# Patient Record
Sex: FEMALE | Race: ASIAN | NOT HISPANIC OR LATINO | URBAN - METROPOLITAN AREA
[De-identification: names, ages, dates, MRNs, and addresses within clinical notes are randomized per-mention and may not be internally consistent; named-entity substitution may affect disease eponyms.]

---

## 2017-11-28 ENCOUNTER — COMMUNICATION - HEALTHEAST (OUTPATIENT)
Dept: FAMILY MEDICINE | Facility: CLINIC | Age: 37
End: 2017-11-28

## 2017-11-28 ENCOUNTER — COMMUNICATION - HEALTHEAST (OUTPATIENT)
Dept: SCHEDULING | Facility: CLINIC | Age: 37
End: 2017-11-28

## 2017-11-29 ENCOUNTER — OFFICE VISIT - HEALTHEAST (OUTPATIENT)
Dept: FAMILY MEDICINE | Facility: CLINIC | Age: 37
End: 2017-11-29

## 2017-11-29 DIAGNOSIS — A04.8 H. PYLORI INFECTION: ICD-10-CM

## 2017-11-29 DIAGNOSIS — K21.9 GASTROESOPHAGEAL REFLUX DISEASE WITHOUT ESOPHAGITIS: ICD-10-CM

## 2017-11-29 ASSESSMENT — MIFFLIN-ST. JEOR: SCORE: 1149.43

## 2018-12-20 ENCOUNTER — COMMUNICATION - HEALTHEAST (OUTPATIENT)
Dept: FAMILY MEDICINE | Facility: CLINIC | Age: 38
End: 2018-12-20

## 2018-12-20 ENCOUNTER — AMBULATORY - HEALTHEAST (OUTPATIENT)
Dept: CARE COORDINATION | Facility: CLINIC | Age: 38
End: 2018-12-20

## 2018-12-20 DIAGNOSIS — Z65.3 PROBLEMS RELATED TO OTHER LEGAL CIRCUMSTANCES: ICD-10-CM

## 2019-01-04 ENCOUNTER — COMMUNICATION - HEALTHEAST (OUTPATIENT)
Dept: FAMILY MEDICINE | Facility: CLINIC | Age: 39
End: 2019-01-04

## 2019-01-21 ENCOUNTER — AMBULATORY - HEALTHEAST (OUTPATIENT)
Dept: NURSING | Facility: CLINIC | Age: 39
End: 2019-01-21

## 2019-01-22 ENCOUNTER — AMBULATORY - HEALTHEAST (OUTPATIENT)
Dept: CARE COORDINATION | Facility: CLINIC | Age: 39
End: 2019-01-22

## 2019-01-22 ENCOUNTER — COMMUNICATION - HEALTHEAST (OUTPATIENT)
Dept: CARE COORDINATION | Facility: CLINIC | Age: 39
End: 2019-01-22

## 2019-01-22 DIAGNOSIS — Z71.89 COUNSELING AND COORDINATION OF CARE: ICD-10-CM

## 2019-01-24 ENCOUNTER — COMMUNICATION - HEALTHEAST (OUTPATIENT)
Dept: NURSING | Facility: CLINIC | Age: 39
End: 2019-01-24

## 2019-02-08 ENCOUNTER — COMMUNICATION - HEALTHEAST (OUTPATIENT)
Dept: CARE COORDINATION | Facility: CLINIC | Age: 39
End: 2019-02-08

## 2019-02-18 ENCOUNTER — COMMUNICATION - HEALTHEAST (OUTPATIENT)
Dept: NURSING | Facility: CLINIC | Age: 39
End: 2019-02-18

## 2019-04-01 ENCOUNTER — COMMUNICATION - HEALTHEAST (OUTPATIENT)
Dept: NURSING | Facility: CLINIC | Age: 39
End: 2019-04-01

## 2019-04-26 ENCOUNTER — AMBULATORY - HEALTHEAST (OUTPATIENT)
Dept: NURSING | Facility: CLINIC | Age: 39
End: 2019-04-26

## 2019-05-15 ENCOUNTER — COMMUNICATION - HEALTHEAST (OUTPATIENT)
Dept: NURSING | Facility: CLINIC | Age: 39
End: 2019-05-15

## 2019-07-03 ENCOUNTER — COMMUNICATION - HEALTHEAST (OUTPATIENT)
Dept: NURSING | Facility: CLINIC | Age: 39
End: 2019-07-03

## 2019-07-11 ENCOUNTER — COMMUNICATION - HEALTHEAST (OUTPATIENT)
Dept: NURSING | Facility: CLINIC | Age: 39
End: 2019-07-11

## 2019-07-15 ENCOUNTER — COMMUNICATION - HEALTHEAST (OUTPATIENT)
Dept: CARE COORDINATION | Facility: CLINIC | Age: 39
End: 2019-07-15

## 2019-07-25 ENCOUNTER — COMMUNICATION - HEALTHEAST (OUTPATIENT)
Dept: NURSING | Facility: CLINIC | Age: 39
End: 2019-07-25

## 2019-07-25 ENCOUNTER — COMMUNICATION - HEALTHEAST (OUTPATIENT)
Dept: CARE COORDINATION | Facility: CLINIC | Age: 39
End: 2019-07-25

## 2019-09-26 ENCOUNTER — COMMUNICATION - HEALTHEAST (OUTPATIENT)
Dept: NURSING | Facility: CLINIC | Age: 39
End: 2019-09-26

## 2019-09-30 ENCOUNTER — COMMUNICATION - HEALTHEAST (OUTPATIENT)
Dept: CARE COORDINATION | Facility: CLINIC | Age: 39
End: 2019-09-30

## 2019-10-31 ENCOUNTER — AMBULATORY - HEALTHEAST (OUTPATIENT)
Dept: FAMILY MEDICINE | Facility: CLINIC | Age: 39
End: 2019-10-31

## 2019-10-31 ENCOUNTER — OFFICE VISIT - HEALTHEAST (OUTPATIENT)
Dept: FAMILY MEDICINE | Facility: CLINIC | Age: 39
End: 2019-10-31

## 2019-10-31 DIAGNOSIS — T74.31XD ABUSIVE EMOTIONAL RELATIONSHIP WITH PARTNER OR SPOUSE, SUBSEQUENT ENCOUNTER: ICD-10-CM

## 2019-10-31 DIAGNOSIS — Z13.220 LIPID SCREENING: ICD-10-CM

## 2019-10-31 DIAGNOSIS — Z00.00 ROUTINE GENERAL MEDICAL EXAMINATION AT A HEALTH CARE FACILITY: ICD-10-CM

## 2019-10-31 DIAGNOSIS — D50.9 IRON DEFICIENCY ANEMIA, UNSPECIFIED IRON DEFICIENCY ANEMIA TYPE: ICD-10-CM

## 2019-10-31 DIAGNOSIS — R53.83 FATIGUE, UNSPECIFIED TYPE: ICD-10-CM

## 2019-10-31 DIAGNOSIS — G47.26 SLEEP DISORDER, SHIFT WORK: ICD-10-CM

## 2019-10-31 DIAGNOSIS — Z13.1 SCREENING FOR DIABETES MELLITUS: ICD-10-CM

## 2019-10-31 DIAGNOSIS — N64.4 MASTALGIA IN FEMALE: ICD-10-CM

## 2019-10-31 LAB
ALBUMIN SERPL-MCNC: 3.8 G/DL (ref 3.5–5)
ALP SERPL-CCNC: 75 U/L (ref 45–120)
ALT SERPL W P-5'-P-CCNC: 14 U/L (ref 0–45)
ANION GAP SERPL CALCULATED.3IONS-SCNC: 5 MMOL/L (ref 5–18)
AST SERPL W P-5'-P-CCNC: 23 U/L (ref 0–40)
BILIRUB SERPL-MCNC: 0.4 MG/DL (ref 0–1)
BUN SERPL-MCNC: 6 MG/DL (ref 8–22)
CALCIUM SERPL-MCNC: 8.9 MG/DL (ref 8.5–10.5)
CHLORIDE BLD-SCNC: 109 MMOL/L (ref 98–107)
CHOLEST SERPL-MCNC: 137 MG/DL
CO2 SERPL-SCNC: 25 MMOL/L (ref 22–31)
CREAT SERPL-MCNC: 0.64 MG/DL (ref 0.6–1.1)
ERYTHROCYTE [DISTWIDTH] IN BLOOD BY AUTOMATED COUNT: 13.2 % (ref 11–14.5)
FASTING STATUS PATIENT QL REPORTED: YES
GFR SERPL CREATININE-BSD FRML MDRD: >60 ML/MIN/1.73M2
GLUCOSE BLD-MCNC: 83 MG/DL (ref 70–125)
HBA1C MFR BLD: 6 % (ref 3.5–6)
HCT VFR BLD AUTO: 32 % (ref 35–47)
HDLC SERPL-MCNC: 38 MG/DL
HGB BLD-MCNC: 10.2 G/DL (ref 12–16)
LDLC SERPL CALC-MCNC: 65 MG/DL
MCH RBC QN AUTO: 24.5 PG (ref 27–34)
MCHC RBC AUTO-ENTMCNC: 32 G/DL (ref 32–36)
MCV RBC AUTO: 77 FL (ref 80–100)
PLATELET # BLD AUTO: 284 THOU/UL (ref 140–440)
PMV BLD AUTO: 7.4 FL (ref 7–10)
POTASSIUM BLD-SCNC: 3.8 MMOL/L (ref 3.5–5)
PROT SERPL-MCNC: 6.9 G/DL (ref 6–8)
RBC # BLD AUTO: 4.17 MILL/UL (ref 3.8–5.4)
SODIUM SERPL-SCNC: 139 MMOL/L (ref 136–145)
TRIGL SERPL-MCNC: 171 MG/DL
TSH SERPL DL<=0.005 MIU/L-ACNC: 4.26 UIU/ML (ref 0.3–5)
WBC: 7.9 THOU/UL (ref 4–11)

## 2019-10-31 RX ORDER — LANOLIN ALCOHOL/MO/W.PET/CERES
3 CREAM (GRAM) TOPICAL
Qty: 90 TABLET | Refills: 3 | Status: SHIPPED | OUTPATIENT
Start: 2019-10-31 | End: 2023-05-10

## 2019-10-31 ASSESSMENT — MIFFLIN-ST. JEOR: SCORE: 1118.81

## 2019-10-31 ASSESSMENT — PATIENT HEALTH QUESTIONNAIRE - PHQ9: SUM OF ALL RESPONSES TO PHQ QUESTIONS 1-9: 11

## 2020-01-15 ENCOUNTER — OFFICE VISIT - HEALTHEAST (OUTPATIENT)
Dept: FAMILY MEDICINE | Facility: CLINIC | Age: 40
End: 2020-01-15

## 2020-01-15 DIAGNOSIS — Z23 NEED FOR IMMUNIZATION AGAINST INFLUENZA: ICD-10-CM

## 2020-01-15 DIAGNOSIS — Z12.4 SCREENING FOR MALIGNANT NEOPLASM OF CERVIX: ICD-10-CM

## 2020-05-05 ENCOUNTER — OFFICE VISIT - HEALTHEAST (OUTPATIENT)
Dept: FAMILY MEDICINE | Facility: CLINIC | Age: 40
End: 2020-05-05

## 2020-05-05 DIAGNOSIS — Z13.31 SCREENING FOR DEPRESSION: ICD-10-CM

## 2020-05-05 DIAGNOSIS — H10.30 ACUTE BACTERIAL CONJUNCTIVITIS, UNSPECIFIED LATERALITY: ICD-10-CM

## 2020-05-05 ASSESSMENT — PATIENT HEALTH QUESTIONNAIRE - PHQ9: SUM OF ALL RESPONSES TO PHQ QUESTIONS 1-9: 8

## 2021-05-19 ENCOUNTER — OFFICE VISIT - HEALTHEAST (OUTPATIENT)
Dept: FAMILY MEDICINE | Facility: CLINIC | Age: 41
End: 2021-05-19

## 2021-05-19 DIAGNOSIS — R12 HEARTBURN: ICD-10-CM

## 2021-05-19 DIAGNOSIS — G47.26 SLEEP DISORDER, SHIFT WORK: ICD-10-CM

## 2021-05-19 DIAGNOSIS — R53.83 FATIGUE, UNSPECIFIED TYPE: ICD-10-CM

## 2021-05-19 DIAGNOSIS — D50.9 IRON DEFICIENCY ANEMIA, UNSPECIFIED IRON DEFICIENCY ANEMIA TYPE: ICD-10-CM

## 2021-05-19 LAB
ANION GAP SERPL CALCULATED.3IONS-SCNC: 11 MMOL/L (ref 5–18)
BUN SERPL-MCNC: 10 MG/DL (ref 8–22)
CALCIUM SERPL-MCNC: 9.1 MG/DL (ref 8.5–10.5)
CHLORIDE BLD-SCNC: 108 MMOL/L (ref 98–107)
CO2 SERPL-SCNC: 23 MMOL/L (ref 22–31)
CREAT SERPL-MCNC: 0.69 MG/DL (ref 0.6–1.1)
ERYTHROCYTE [DISTWIDTH] IN BLOOD BY AUTOMATED COUNT: 17.1 % (ref 11–14.5)
GFR SERPL CREATININE-BSD FRML MDRD: >60 ML/MIN/1.73M2
GLUCOSE BLD-MCNC: 95 MG/DL (ref 70–125)
HCT VFR BLD AUTO: 29.3 % (ref 35–47)
HGB BLD-MCNC: 8.9 G/DL (ref 12–16)
MCH RBC QN AUTO: 21.9 PG (ref 27–34)
MCHC RBC AUTO-ENTMCNC: 30.4 G/DL (ref 32–36)
MCV RBC AUTO: 72 FL (ref 80–100)
PLATELET # BLD AUTO: 294 THOU/UL (ref 140–440)
PMV BLD AUTO: 8.9 FL (ref 7–10)
POTASSIUM BLD-SCNC: 4.1 MMOL/L (ref 3.5–5)
RBC # BLD AUTO: 4.06 MILL/UL (ref 3.8–5.4)
SODIUM SERPL-SCNC: 142 MMOL/L (ref 136–145)
WBC: 6.8 THOU/UL (ref 4–11)

## 2021-05-19 RX ORDER — FAMOTIDINE 20 MG/1
20 TABLET, FILM COATED ORAL 2 TIMES DAILY
Qty: 60 TABLET | Refills: 1 | Status: SHIPPED | OUTPATIENT
Start: 2021-05-19 | End: 2022-12-07

## 2021-05-19 RX ORDER — FERROUS SULFATE 325(65) MG
1 TABLET ORAL DAILY
Qty: 60 TABLET | Refills: 6 | Status: SHIPPED | OUTPATIENT
Start: 2021-05-19 | End: 2021-07-28

## 2021-05-19 ASSESSMENT — MIFFLIN-ST. JEOR: SCORE: 1121.52

## 2021-05-26 ASSESSMENT — PATIENT HEALTH QUESTIONNAIRE - PHQ9: SUM OF ALL RESPONSES TO PHQ QUESTIONS 1-9: 11

## 2021-05-27 VITALS
HEIGHT: 58 IN | OXYGEN SATURATION: 96 % | WEIGHT: 124.7 LBS | BODY MASS INDEX: 26.17 KG/M2 | SYSTOLIC BLOOD PRESSURE: 100 MMHG | TEMPERATURE: 98.1 F | DIASTOLIC BLOOD PRESSURE: 60 MMHG | HEART RATE: 98 BPM | RESPIRATION RATE: 14 BRPM

## 2021-05-27 ASSESSMENT — PATIENT HEALTH QUESTIONNAIRE - PHQ9: SUM OF ALL RESPONSES TO PHQ QUESTIONS 1-9: 8

## 2021-05-27 NOTE — PROGRESS NOTES
Patient expressed that she'd like an [facility] worker who can visit her at home weekly and assist with paperwork. Patient is scheduled to meet with [facility]  on 04/26/2019.          Next outreach: 05/15/2019.

## 2021-05-28 NOTE — PROGRESS NOTES
Assessment    SW met with pt, pt's  Tc Concepcion,  Popeye, and DANIELE Aparicio.     SW explained purpose of [employer] worker to assist pt w/ paperwork and 's legal matters. Pt was initially confused, but  was able to clarify and pt agreed that this would be helpful. Pt wanted to know how she would understand [employer] worker. SW told pt referral would be through [facility] because they have a refugee program and a Colleen  would be provided. Pt was agreeable to this plan and signed [facility].    Pt and pt's  (Tc Concepcion) wanted to further discuss pt's husbands legal issues and wanted additional help connecting with a . SW informed pt and pt's  that this could be done at a later date due to time constraints of today's appt and scheduled a follow up visit for 5/17 (under Tc Concepcion).    Action Plan:    will: Placed referral for [facility] worker through [facility]. Will continue to be available for pt PRN.    Care Guide will: Continue outreach. See pt's husbands follow up appt scheduled for 5/17.    Subjective     Pt lacks eye contact and appears distracted/confused throughout duration of visit.     Objective    None.    Goals       I would like an [employer] worker to increase my supports in the community  (pt-stated)      Action steps to achieve this goal    1.  I will respond to [facility] when they call re: my [facility] worker referral.  2.  I will set up and complete a diagnostic assessment with [facility] to assess my needs and what an [facility] worker may be able to assist with.  3.  I will do my best to remember my upcoming appointments.  4.  I will try to take a day off work when I know advance of my upcoming appointments.     Date goal set:  1/21/19  Updated: 4/1/2019; 4/26/19 KL

## 2021-05-30 NOTE — PROGRESS NOTES
This Maintenance Wellness Plan provides private information in regard to the work I have done with my Care Team at my Primary Care Clinic.  This document provides insight on the goals I have worked hard to achieve.  My Care Team congratulates me on my journey to become well.  With the assistance of the Clinic Care Coordination Team and my Primary Care Provider, I have succeeded in improving areas of my health that I identified as barriers to becoming well.  I will continue to seek wellness and use the skills I have obtained to further my journey.  My Community Health Worker will follow up with me in two months.  In the meantime, if I should have any questions or concerns I will contact my Community Health Worker.     My Clinic Care Coordination Wellness Plan    [facility]  [address] [place]  [address]  [phone]      My Preferred Method of Contact:  Phone: [phone]    My Primary/Preferred Language:  Colleen    Preferred Learning Style:  Face to face discussion, Pictures/Diagrams and Hands on teaching    Emergency Contact: Extended Emergency Contact Information  Primary Emergency Contact: Tc Concepcion  Address: [address] [place] [address] [place]  Mobile Phone: [phone]  Relation: Spouse     PCP:  Jose L Flores MD  Specialists:    None  Accomplishments:  Goals       COMPLETED: I would like an [employer] worker to increase my supports in the community  (pt-stated)      Action steps to achieve this goal    1.  I now have an [facility] worker from [facility] who is visiting me weekly.  2.  I will continue to utilize [employer] worker to increase my support in the community.  3.  I will call my ARMHS worker when needed.      Date goal set:  1/21/19  Updated: 4/1/2019; 4/26/19 KL; 05/15/2019.  Goal completed: 07/11/2019.        COMPLETED: I would like to apply for SNAP/cash assistance (pt-stated)      Action steps to achieve this goal    1. I  am back to work daily and I have SNAP. I will contact the clinic Care Guide if need to be seen for SNAP and cash assistance.          Goal completed: 02/18/2019.  Date goal set:  1/21/19            Advanced Directive/Living Will: The patient was given information regarding Adanced Directives/Living Will    Clinical Emergency Plan    All [facility] clinic patients have access to a Nurse 24 hours a day, 7 days a week.  If you have questions or want advice from a Nurse, please know [facility] is here for you.  You can call your clinic and they will connect you or you can call Care Connection at [phone].  [facility] also has Walk In Care clinics in multiple locations.  Call the number listed above for more information about our Walk In Care clinics or visit the [facility] website at [email or web address].

## 2021-05-30 NOTE — PROGRESS NOTES
[employer] [facility]    Open Encounter today.  Episodes created, care management status validated and encounters linked     CC CHW will review patient's chart in two weeks when chart review is done by CC RN and CC SW and advise to move to maintenance.      Next outreach: 07/25/2019

## 2021-05-30 NOTE — PROGRESS NOTES
Clinic Care Coordination Contact    Situation: Patient chart reviewed by care coordinator.    Background: All previous goals completed.    Assessment: Pt has been assigned an [facility] worker through [facility]. No active needs, concerns, or goals identified from [facility] SW standpoint at this time.    Plan/Recommendations:     1.) Move pt to maintenance

## 2021-05-31 VITALS — HEIGHT: 58 IN | WEIGHT: 130 LBS | BODY MASS INDEX: 27.29 KG/M2

## 2021-06-01 NOTE — PROGRESS NOTES
Clinic Care Coordination Contact    Situation: Patient chart reviewed by care coordinator.    Background:     Andreas Card, CHW 4 days ago         Situation(s):  CHW outreach and follow up with patient.     Background:  Patient has been on maintenance with [facility]. Patient completed all goals, connected with community resources and has [facility] worker from [facility].     Assessment:  Patient has no new medical concerns, health insurance is active, no recent ED visit or hospitalization.     Recommendation(s):  Patient was advise to call the clinic if need to be seen and follow up with primary doctor as recommended and scheduled.        CCC RN, please advise to discharge patient from [facility].          Assessment:   Chart review completed.   Was last seen by PCP 11/29/17   Unable to reach patient via phone today to assist to schedule to see PCP.   Patient was referral to [facility] for:  Provider Comments 12/20/2018  1:29 PM Alessandra Herrmann, BOLIVAR Provider Comments -   Note    Legal issues- spouse in snf              Met with [facility] SW several times since the referral and completed all goals. Recommended by SW to step down to maintenance as of 7/15/19    Plan/Recommendations:   1) message sent to scheduling pool to schedule appt with PCP for annual exam - last seen by PCP on 11/29/19      Emergency Plan Recommendation:    When to Use the Emergency Department (ED)  An emergency means you could die if you don t get care quickly. Or you could be hurt permanently (disabled). Read below to know when to use -- and when not to use -- an emergency department (also called ED).    Dangers to your life  Here are examples of emergencies. These need immediate care:  A hard time breathing  Severe chest pain  Choking  Severe bleeding  Suddenly not able to move or speak  Blacking out (fainting)`  Poisoning    Dangers of permanent injuries  Here are other emergencies. These also need immediate care:  Deep cuts or severe burns  Broken  bones, or sudden severe pain and swelling in a joint    When it s an emergency  If you have an emergency, follow these steps:    1. Go to the nearest emergency department  If you can, go to the hospital ED closest to you right away.  If you cannot get there right away, or if it is not safe to take yourself, call 911 or your police emergency number.  2. Call your primary care doctor  Tell your doctor about the emergency. Call within 24 hours of going to the ED.  If you cannot call, have someone call for you.  Go to your doctor (not the ED) for any follow-up care.    When it s not an emergency  If a problem is not an emergency, follow these steps:    1. Call your primary care doctor  If you don t know the name of your doctor, call your health plan.  If you cannot call, have someone call for you.  2. Follow instructions  Your doctor will tell you what you should do.  You may be told to see your doctor right away. You may be told to go to the ED. Or you may be told to go to an urgent care center.  Follow your doctor s advice.  Tips forTakingMedicine  It s easy to forget to take your medicine, especially when you take a lot of pills. But, to get the best results from medicines, always take them as directed. The tips on these pages can help you keep track.  Staying on schedule  Every medicine has a different purpose. So, each one needs to be taken as prescribed. Don t skip pills or stop taking a medicine, even when you feel fine. To stay on track try to:  Take your medicine at set times. You could take it each morning with breakfast or right before you go to bed. Some medicines may need to be taken at certain times of the day, or with food. Ask your doctor if this is the case for any of your medicines.  Find ways to remind yourself to take medicine. Use a pillbox or organize pills for the week. Set your watch or cell phone alarm to go off when you re supposed to take your medicine. Or, put a note on the bathroom mirror to  remind yourself.  Have your prescriptions refilled while you still have plenty of pills left. Keep in mind that certain suppliers, such as mail order pharmacies, may take longer to fill prescriptions.  When traveling, keep all medicines in your carry-on bag. This way you ll have them in case you and your checked luggage get . Also, bring copies of each of your prescriptions when you travel.  Safety tips  Read the warning labels and usage instructions for each medicine you take. Also, keep these safety tips in mind:  Get help organizing your pills if you need it. Taking more than one medicine can be confusing. A family member or friend can help prevent you from making a mistake that could be dangerous to your health.  Fill all your prescriptions at the same drug store. This way, your records are all in one place.  Ask your pharmacist or doctor for a  fact sheet  or other patient information when you start a new medicine.  Tell your doctor and pharmacist if you have allergies to any medicine.  Don t split your pills to save money. Talk to your doctor if you re having trouble paying for your medicine.  Never share medicine with anyone.  Ask your pharmacy how you should dispose of old or  medicine.  Give a copy of your medicine list to a family member or close friend. Hold copies of each other s lists in case of emergency.  Store medicines in a cool, dry, dark place - not in a steamy bathroom.  Make sure you tell your healthcare providers if you are taking any other supplements or drugs over-the-counter.  If you have side effects  Some medicines can cause side effects, such as nausea or dizziness. Tell your doctor if you have any side effects. He or she may change the dosage or schedule to reduce effects. Be sure to keep taking your medicine as directed, and always talk to your healthcare team about how you feel. Your feedback will help the doctor find the best medicine plan for you.  Know the  Medications You reTaking   You should know certain details about your medications. This helps you take them correctly and safely. For each medicine, ask your doctor or pharmacist the questions below. Write down the answers so you don t forget. Then fill in the information on your medication list. Also, ask about anything you don t understand or that seems wrong. For instance, if you get a refill and the pills don t look like the ones from last time, talk to the pharmacist before taking them.  Questions to ask   What is the medication's name? Find out the brand name as well as the generic name, if any.  Why am I taking this? What does it do? How fast will it work?  How often should I take this? At what time of day?  How much of the medication (what dosage) should I take? How many pills is that?  What should I do if I miss a dose? What are some of the symptoms that may occur if I miss a dose?  Should I expect any side effects from this medication? What should I do if I have them?  Do I follow any special instructions while taking this? Are there any activities, foods, or other medicines I should avoid while taking this?  How long should I keep taking this? When I run out, should I order more?  How often should I come in to have this medication monitored?  Beware of medication interactions  Vitamins, herbal supplements, and some over-the-counter drugs can be dangerous to take if you use heart medications. So tell your doctor about all products you re taking. This includes even simple remedies for headaches, allergies, colds, or constipation. Show your medication list to the pharmacist every time you buy prescription or over-the-counter medication. They can tell you which drugs to avoid. Also, drinking alcohol while taking heart medication can be dangerous.

## 2021-06-01 NOTE — PROGRESS NOTES
Situation(s):  CHW outreach and follow up with patient.    Background:  Patient has been on maintenance with [facility]. Patient completed all goals, connected with community resources and has [facility] worker from [facility].    Assessment:  Patient has no new medical concerns, health insurance is active, no recent ED visit or hospitalization.    Recommendation(s):  Patient was advise to call the clinic if need to be seen and follow up with primary doctor as recommended and scheduled.    [facility] RN has reviewed patient's chart and advised to discharge or graduate patient from [facility].    This Graduation Wellness Plan provides private information in regard to the work I have done with my Care Team at my Primary Care Clinic.  This document provides insight on the goals I have accomplished.  My Care Team congratulates me on my journey to maintain wellness.  This document will help guide me on my journey to maintain a healthy lifestyle.  I will use this to help me overcome any barriers I may encounter.  If I should have any questions or concerns, I will contact the members of my Care Team or contact my Primary Care Clinic for assistance.      Assessment:   Chart review completed.   Was last seen by PCP 11/29/17   Unable to reach patient via phone today to assist to schedule to see PCP.   Patient was referral to [facility] for:  Provider Comments 12/20/2018  1:29 PM Alessandra Herrmann LICSW Provider Comments -       Note     Legal issues- spouse in prison               Met with [facility] SW several times since the referral and completed all goals. Recommended by SW to step down to maintenance as of 7/15/19     Plan/Recommendations:   1) message sent to scheduling pool to schedule appt with PCP for annual exam - last seen by PCP on 11/29/19        Emergency Plan Recommendation:     When to Use the Emergency Department (ED)  An emergency means you could die if you don t get care quickly. Or you could be hurt permanently (disabled). Read  below to know when to use -- and when not to use -- an emergency department (also called ED).     Dangers to your life  Here are examples of emergencies. These need immediate care:  A hard time breathing  Severe chest pain  Choking  Severe bleeding  Suddenly not able to move or speak  Blacking out (fainting)`  Poisoning     Dangers of permanent injuries  Here are other emergencies. These also need immediate care:  Deep cuts or severe burns  Broken bones, or sudden severe pain and swelling in a joint     When it s an emergency  If you have an emergency, follow these steps:     1. Go to the nearest emergency department  If you can, go to the hospital ED closest to you right away.  If you cannot get there right away, or if it is not safe to take yourself, call 911 or your police emergency number.  2. Call your primary care doctor  Tell your doctor about the emergency. Call within 24 hours of going to the ED.  If you cannot call, have someone call for you.  Go to your doctor (not the ED) for any follow-up care.     When it s not an emergency  If a problem is not an emergency, follow these steps:     1. Call your primary care doctor  If you don t know the name of your doctor, call your health plan.  If you cannot call, have someone call for you.  2. Follow instructions  Your doctor will tell you what you should do.  You may be told to see your doctor right away. You may be told to go to the ED. Or you may be told to go to an urgent care center.  Follow your doctor s advice.  Tips forTakingMedicine  It s easy to forget to take your medicine, especially when you take a lot of pills. But, to get the best results from medicines, always take them as directed. The tips on these pages can help you keep track.  Staying on schedule  Every medicine has a different purpose. So, each one needs to be taken as prescribed. Don t skip pills or stop taking a medicine, even when you feel fine. To stay on track try to:    Take your  medicine at set times. You could take it each morning with breakfast or right before you go to bed. Some medicines may need to be taken at certain times of the day, or with food. Ask your doctor if this is the case for any of your medicines.    Find ways to remind yourself to take medicine. Use a pillbox or organize pills for the week. Set your watch or cell phone alarm to go off when you re supposed to take your medicine. Or, put a note on the bathroom mirror to remind yourself.    Have your prescriptions refilled while you still have plenty of pills left. Keep in mind that certain suppliers, such as mail order pharmacies, may take longer to fill prescriptions.    When traveling, keep all medicines in your carry-on bag. This way you ll have them in case you and your checked luggage get . Also, bring copies of each of your prescriptions when you travel.  Safety tips  Read the warning labels and usage instructions for each medicine you take. Also, keep these safety tips in mind:    Get help organizing your pills if you need it. Taking more than one medicine can be confusing. A family member or friend can help prevent you from making a mistake that could be dangerous to your health.    Fill all your prescriptions at the same drug store. This way, your records are all in one place.    Ask your pharmacist or doctor for a  fact sheet  or other patient information when you start a new medicine.    Tell your doctor and pharmacist if you have allergies to any medicine.    Don t split your pills to save money. Talk to your doctor if you re having trouble paying for your medicine.    Never share medicine with anyone.    Ask your pharmacy how you should dispose of old or  medicine.    Give a copy of your medicine list to a family member or close friend. Hold copies of each other s lists in case of emergency.    Store medicines in a cool, dry, dark place - not in a steamy bathroom.    Make sure you tell your  healthcare providers if you are taking any other supplements or drugs over-the-counter.  If you have side effects  Some medicines can cause side effects, such as nausea or dizziness. Tell your doctor if you have any side effects. He or she may change the dosage or schedule to reduce effects. Be sure to keep taking your medicine as directed, and always talk to your healthcare team about how you feel. Your feedback will help the doctor find the best medicine plan for you.  Know the Medications You reTaking   You should know certain details about your medications. This helps you take them correctly and safely. For each medicine, ask your doctor or pharmacist the questions below. Write down the answers so you don t forget. Then fill in the information on your medication list. Also, ask about anything you don t understand or that seems wrong. For instance, if you get a refill and the pills don t look like the ones from last time, talk to the pharmacist before taking them.  Questions to ask     What is the medication's name? Find out the brand name as well as the generic name, if any.    Why am I taking this? What does it do? How fast will it work?    How often should I take this? At what time of day?    How much of the medication (what dosage) should I take? How many pills is that?    What should I do if I miss a dose? What are some of the symptoms that may occur if I miss a dose?    Should I expect any side effects from this medication? What should I do if I have them?    Do I follow any special instructions while taking this? Are there any activities, foods, or other medicines I should avoid while taking this?    How long should I keep taking this? When I run out, should I order more?    How often should I come in to have this medication monitored?  Beware of medication interactions  Vitamins, herbal supplements, and some over-the-counter drugs can be dangerous to take if you use heart medications. So tell your doctor  about all products you re taking. This includes even simple remedies for headaches, allergies, colds, or constipation. Show your medication list to the pharmacist every time you buy prescription or over-the-counter medication. They can tell you which drugs to avoid. Also, drinking alcohol while taking heart medication can be dangerous.
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
## 2021-06-03 VITALS
SYSTOLIC BLOOD PRESSURE: 118 MMHG | WEIGHT: 125 LBS | BODY MASS INDEX: 26.24 KG/M2 | HEIGHT: 58 IN | TEMPERATURE: 97.8 F | HEART RATE: 68 BPM | DIASTOLIC BLOOD PRESSURE: 72 MMHG | RESPIRATION RATE: 16 BRPM

## 2021-06-07 NOTE — PROGRESS NOTES
"Sav Romero is a 40 y.o. female who is being evaluated via a billable telephone visit.      Called via .    Patient reports she feels safe at home and with her relationships.    The patient has been notified of following:     \"This telephone visit will be conducted via a call between you and your physician/provider. We have found that certain health care needs can be provided without the need for a physical exam.  This service lets us provide the care you need with a short phone conversation.  If a prescription is necessary we can send it directly to your pharmacy.  If lab work is needed we can place an order for that and you can then stop by our lab to have the test done at a later time.    Telephone visits are billed at different rates depending on your insurance coverage. During this emergency period, for some insurers they may be billed the same as an in-person visit.  Please reach out to your insurance provider with any questions.    If during the course of the call the physician/provider feels a telephone visit is not appropriate, you will not be charged for this service.\"     Patient has given verbal consent to a Telephone visit? Yes    What phone number would you like to be contacted at? [phone]    Patient would like to receive their AVS by AVS Preference: Mail a copy.    Additional provider notes:     Applied Icy Hot to forehead yesterday.  Bilateral eye irritation now.  She feels OK to work tomorrow.    Assessment/Plan:  1. Acute bacterial conjunctivitis, unspecified laterality    - gentamicin (GARAMYCIN) 0.3 % ophthalmic solution; Administer 1 drop into the left eye 3 (three) times a day for 5 days.  Dispense: 5 mL; Refill: 0    2. Screening for depression      - PHQ9 Depression Screen    Call if any problem.    Visit was conducted with professional Colleen .    Phone call duration:  8 minutes    Jose L Flores MD      "

## 2021-06-14 NOTE — PROGRESS NOTES
Assessment: /    Plan:    1. H. pylori infection  amoxicillin (AMOXIL) 500 MG capsule    clarithromycin (BIAXIN) 500 MG tablet    metroNIDAZOLE (FLAGYL) 500 MG tablet    omeprazole (PRILOSEC) 20 MG capsule   2. Gastroesophageal reflux disease without esophagitis  ranitidine (ZANTAC) 150 MG tablet       After the patient left the clinic, further chart review revealed previous positive H. Pylori.  I called the patient via telelanguage and explained the medications.  When she has the medications in hand, she will make an appointment for teaching about the medications so that she can have the instructions written in Colleen.  Patient was seen with professional Colleen , Fabiola Ruiz.      Subjective:    HPI:  Sav Romero is a 37-year-old female presenting with reflux symptoms.  This has been occurring for 1 month.      Review of Systems: No vomiting, melena, polyuria.      Current Outpatient Prescriptions   Medication Sig Dispense Refill     amoxicillin (AMOXIL) 500 MG capsule Take 2 capsules (1,000 mg total) by mouth 2 (two) times a day for 14 days. 56 capsule 0     clarithromycin (BIAXIN) 500 MG tablet Take 1 tablet (500 mg total) by mouth 2 (two) times a day for 14 days. 28 tablet 0     metroNIDAZOLE (FLAGYL) 500 MG tablet Take 1 tablet (500 mg total) by mouth 2 (two) times a day for 14 days. 28 tablet 0     omeprazole (PRILOSEC) 20 MG capsule Take 1 capsule (20 mg total) by mouth daily. 28 capsule 0     ranitidine (ZANTAC) 150 MG tablet Take 1 tablet (150 mg total) by mouth 2 (two) times a day. 60 tablet 11     No current facility-administered medications for this visit.          Objective:    Vitals:    11/29/17 1156   BP: 106/66   Pulse: 83   Resp: 16   Temp: 98  F (36.7  C)   SpO2: 99%       Gen:  NAD, VSS  Eyes without conjunctival pallor  Neck supple without adenopathy or thyromegaly  Lungs:  normal  Heart:  normal  Abdomen:  No HSM, mass or tenderness        ADDITIONAL HISTORY SUMMARIZED (2): None.  DECISION TO  OBTAIN EXTRA INFORMATION (1): None.   RADIOLOGY TESTS (1): None.  LABS (1): None.  MEDICINE TESTS (1): None.  INDEPENDENT REVIEW (2 each): None.     Total Data Points: 0

## 2021-06-16 PROBLEM — T74.31XA ABUSIVE EMOTIONAL RELATIONSHIP WITH PARTNER OR SPOUSE: Status: ACTIVE | Noted: 2019-10-31

## 2021-06-16 PROBLEM — G47.26 SLEEP DISORDER, SHIFT WORK: Status: ACTIVE | Noted: 2019-10-31

## 2021-06-16 NOTE — TELEPHONE ENCOUNTER
Telephone Encounter by Terry Mtz CMA at 1/4/2019  3:25 PM     Author: Terry Mtz CMA Service: -- Author Type: Certified Medical Assistant    Filed: 1/4/2019  3:25 PM Encounter Date: 1/4/2019 Status: Signed    : Terry Mtz CMA (Certified Medical Assistant)       Shayy Riggs 3 minutes ago (3:20 PM)      Unfortunately, no, the SW is booked out to 1/25/19 at this time. The patient was informed of how busy the SW is when she made the appointment and was given the soonest appointment we had available. (Routing comment)

## 2021-06-17 NOTE — PROGRESS NOTES
"OUTPATIENT VISIT NOTE                                                   Date of Visit: 2021     Chief Complaint   Chief Complaint   Patient presents with     Insomnia     Fatigue     Anorexia         History of Present Illness   Sav Romero is a 41 y.o. female with  by phone c/o trouble sleeping, poor appetite and feeling tired for long time.  No change.  Works until two am.  Can't fall asleep until 4am.  Gets up at 7 am.  Doesn't take a nap.  No fever.  No ear pain.  No sore throat.  No pain any place.  Gets full after eating just a little.  C/o heartburn.         MEDICATIONS   Current Outpatient Medications on File Prior to Visit   Medication Sig Dispense Refill     melatonin 3 mg Tab tablet Take 1 tablet (3 mg total) by mouth at bedtime as needed. 90 tablet 3     multivitamin (ONE A DAY) per tablet Take 1 tablet by mouth daily. 120 tablet 2     [DISCONTINUED] ferrous sulfate 325 (65 FE) MG tablet Take 1 tablet (325 mg total) by mouth daily. 60 tablet 1     No current facility-administered medications on file prior to visit.          SOCIAL HISTORY   Social History     Tobacco Use     Smoking status: Former Smoker     Packs/day: 0.00     Quit date: 2003     Years since quittin.2     Smokeless tobacco: Current User     Types: Chew     Tobacco comment: chews betel nut with tobacco.  No passive exposure   Substance Use Topics     Alcohol use: No           Physical Exam   Vitals:    21 1108   BP: 100/60   Pulse: 98   Resp: 14   Temp: 98.1  F (36.7  C)   TempSrc: Oral   SpO2: 96%   Weight: 124 lb 11.2 oz (56.6 kg)   Height: 4' 9.76\" (1.467 m)        GENERAL:   Alert. Oriented.  EYES: Clear  HENT:  Ears: R TM pearly gray. Normal landmarks. L TM pearly gray.  Normal landmarks  Nose: Clear.  Sinuses: Nontender.  Oropharynx:  No erythema. No exudate.  NECK: Supple. No adenopathy.  LUNGS: Clear to ascultation.  No crackles.  No wheezing  HEART: RRR  SKIN:  No rash.   ABDOMEN:  +BS. No tenderness. " Soft, no guarding, rebound, rigidity,mass, or organomegaly. No CVA tenderness         Diagnostic Studies   LABS:  Results for orders placed or performed in visit on 05/19/21   HM2(CBC w/o Differential)   Result Value Ref Range    WBC 6.8 4.0 - 11.0 thou/uL    RBC 4.06 3.80 - 5.40 mill/uL    Hemoglobin 8.9 (L) 12.0 - 16.0 g/dL    Hematocrit 29.3 (L) 35.0 - 47.0 %    MCV 72 (L) 80 - 100 fL    MCH 21.9 (L) 27.0 - 34.0 pg    MCHC 30.4 (L) 32.0 - 36.0 g/dL    RDW 17.1 (H) 11.0 - 14.5 %    Platelets 294 140 - 440 thou/uL    MPV 8.9 7.0 - 10.0 fL            Assessment and Plan   1. Sleep disorder, shift work     2. Fatigue, unspecified type  HM2(CBC w/o Differential)    Basic Metabolic Panel   3. Heartburn  famotidine (PEPCID) 20 MG tablet   4. Iron deficiency anemia, unspecified iron deficiency anemia type  ferrous sulfate 325 (65 FE) MG tablet         Patient is getting about 4 hours of sleep a night.  Discussed with her that this is not enough sleep and she needs to go back to bed after she gets her kids off to school.  In addition, she is anemic. Reviewing her lab results, she has had normal indices in the past so less likely to have a thalassemia trait.  This has been noted before, but she is not taking iron at this time.  This was restarted.  I do not see iron studies on her chart so it would likely be helpful to get them at her next visit.    REcheck within 4 weeks.      Discussed signs / symptoms that warrant urgent / emergent medical attention.     Recheck if worsening or not improving.       Fernanda Ya MD        Pertinent History     The following portions of the patient's history were reviewed and updated as appropriate: allergies, current medications, past family history, past medical history, past social history, past surgical history and problem list.

## 2021-06-21 NOTE — LETTER
Letter by Fernanda Ya MD at      Author: Fernanda Ya MD Service: -- Author Type: --    Filed:  Encounter Date: 5/19/2021 Status: (Other)         May 19, 2021     Patient: Sav Romero   YOB: 1980   Date of Visit: 5/19/2021       To Whom it May Concern:    Sav Romero was seen in my clinic on 5/19/2021.  She was unable to work on 5/17/2021 due to child's illness.        Sincerely,         Electronically signed by Fernanda Ya MD

## 2021-06-21 NOTE — LETTER
Letter by Fernanda Ya MD at      Author: Fernanda Ya MD Service: -- Author Type: --    Filed:  Encounter Date: 5/19/2021 Status: (Other)         May 19, 2021     Patient: Sav Romero   YOB: 1980   Date of Visit: 5/19/2021       To Whom it May Concern:    Sav Romero was seen in my clinic on 5/19/2021.  She was unable to work due to child's illness.    If you have any questions or concerns, please don't hesitate to call.    Sincerely,         Electronically signed by Fernanda Ya MD

## 2021-06-22 NOTE — TELEPHONE ENCOUNTER
Patient notified per note below. The patient verbalizes understanding of provider/CSS instructions for follow-up and continued care per provider message.

## 2021-06-22 NOTE — TELEPHONE ENCOUNTER
Who is calling:  Patient   Reason for Call:  Patient would like to know if she can see the  sooner the 1/21/2019  Date of last appointment with primary care: 11/29/2017  Has the patient been recently seen:  No  Okay to leave a detailed message: Yes

## 2021-06-23 NOTE — PROGRESS NOTES
Programs Applying For: [facility] Benefits/Check on Insurance for family to make sure it is active   Case #:  [facility] Worker:   Sven #:   PMI #:   Tracking:     Outreach:     2/8/2019: FRG checked mnits for active insurance, no insurance is active. FRG called patient to discuss what insurance card she received in the mail. Patient stated health insurance is [employer] and she will bring to next appointment at [facility]. No further FRG outreach needed at this time. Removing myself from Care Team. I am available for assistance if patient has a new need for FRG outreach patient or Care Guide may send a new referral.    CLOSED ENCOUNTER:  1/29/2019: Patient's transportation did not pick her up, transportation  will look into with insurance to see why this occurred. FRG called patient, patient stated her food support is now active and health insurance is active. FRG looked at [place]ITS and does not see active Health Insurance for patient. FRG will make outreach call to patient this week to discuss what insurance cards she received in the mail.     1/22/2019: G called patient with  and screened and scheduled. Evite sent to . Scheduled appointment 1/29 @ 10.     Health Insurance Information:     Referral:   [facility] Epic[employer]/does patient have SNAP  Combined [place] SNAP/Cash  1.Have you applied for [facility] benefits before? If yes, what were they and have you received a letter? (Does it say you are due for renewal, need verification or denied) Yes, patient doesn't know if her EBT cards are active and may need to reapply   2.Household Income (Gross) (Employee Income, Social Security, Unemployment, workers compensation, Severance Pay, Pension)  Oldest son is working, he is a PCA and gives family $60 per month for electricity  3.How many people in the home? 7 (one of the daughters receives SSDI)  4.Do you claim any dependents or are you claimed on someone s taxes or file jointly? Patient and  file with their 4  children, not the child that works.  5.Assets ( 2nd Home, Cabin, 2nd Car, Bank account, stocks, bonds, BRIAN s, 401K) Patient will bring account 3 months statements   6.Have you applied for county benefits before? If yes, what were they and have you received a letter? (Does it say you are due for renewal, need verification or denied)  7. Do you pay utilities? $1200 for rent, borrowed $3,000 from friend and does not know how to pay back.

## 2021-06-23 NOTE — PROGRESS NOTES
"                                       General Care Management Assessment    Reason for Visit- SW assessment    Assessment completed with- Pt, spouse Pa Jamey and daughter Genevieve Cookw;  Araseli Mccray    Living Situation - lives in rented house, pays $1200/month; Lives with spouse and 5 children (19, 15, 13, 11, 7)    Resources-  Has Energy assistance, SNAP?, SSI, MA, 7 year old daughter gets PCA 3 hours/day  (19 year old son is PCA)  Pt has used [employer] in past but [employer] says they can no longer help    Psychosocial -  [place].  Pt has never worked in , 7 year old is developmentally delayed.  Spouse was in residential from December 13 to December 23.   Pt borrowed $3,000 for spouse to get out of residential.  Spouse has court date in April.  Since pt and spouse not working not sure how they will pay rent.     Functional status- Independent    Advance Care Plan/Directive- None    Preventative Care- Dr Flores    Clinic Utilization- [facility]    Transportation - Medical transportation, rides from friends/family    Finances- No income from employment , Family possibly eligible for cash assistance     Barriers in communication - Colleen speaking    Pain- N/A    Medication Concerns- Did not discuss due to time    Diet/Sleep/Exercise/Mood- Did not discuss due to time    Assessment  Pt had multiple needs and appeared impatient with SW.  Pt seemed to think there were immediate solutions to complex issues.  Pt discussed separate issues related to her spouse and child.  SW to complete separate assessments and create goals for them.      Pt asked SW to check status of several EBT cards which she pulled from an envelope.  Each card was \"reported lost or stolen and not valid.\" SW discussed a referral to FRG.  Pt signed [place] needed for [facility]G referral.      SW discussed referral to [facility] for pt as she appears overwhelmed and has a lot of psychosocial stressors.  This will be further discussed at upcoming visit.       Action Plan: "    will:  1)  Referral placed to [facility].  Follow up SW visit scheduled on 2/15/19      Care Guide will:  Follow standard work to enroll pt and see goals below.    Goals        Patient Stated      I would like an [facility] worker to increase my supports in the community  (pt-stated)      Action steps to achieve this goal  1.  I will meet with [facility] SW on 2/15/19 to complete [facility] referral  2.  I will schedule and participate in intake meeting for [facility] worker  3.  I will update my care guide with [facility] worker name/contact info when assigned  4.  I will discuss other community resources with my care guide at outreach calls as needed    Date goal set:  1/21/19        I would like to apply for SNAP/cash assistance (pt-stated)      Action steps to achieve this goal  1.  I will answer [facility]G calls in a timely manner and provide all needed documentation/paperwork  2.  I will attend in-person visits with [facility] when scheduled  3.  I will discuss updates with my care guide during outreach calls.     Date goal set:  1/21/19            What Is Domestic Abuse?  Each day there are people of all ages, races, and income levels who are harmed by those close to them. Domestic abuse is a crime that invades the home. Women and children are often the targets. If you are being abused, now is the time to plan and prepare for a new life. With information and support, you can begin the journey. If you, a friend or family member are being abused, call the National Domestic Violence Hotline at 1?323?618?6860 or TTY 1?800?280?3780. Or contact them online at  www.[email or web address].   How domestic abuse happens  Bodily harm may be done to you. It can range from pushing or slapping to broken bones or forced sex.  Your abuser may try to control you by isolating you. You may be kept away from friends and family members. You may not be able to access money.  Your abuser may frighten or intimidate you. You may be threatened with bodily harm or the loss  of your children. Your abuser may threaten to harm other family members or family pets.  Verbal insults can damage your belief in yourself. You may be called names, put down, harassed, or blamed without cause.  The pattern of abuse  If you are the target of abuse, days or weeks may pass between attacks. But you may see a dangerous pattern that repeats:  Your abuser attacks with words or actions.  Your abuser begs forgiveness and may promise to change.  Your abuser starts acting tense, angry, or depressed. These are signs that abuse will start again.  Domestic Abuse: Changing Your LifeAbuse tends to get worse and occur more often over time. If you are being abused, plan ahead to get out for good. But don t feel discouraged if it takes more than one try. It often does. With courage and help from others, you can change your life.  Increase your safety now  You don t deserve to be abused. Prepare now to protect your health and safety:  Reach out for help. Contact a women s shelter for help with making your plans.  Have an emergency exit. Know how to get out of your home in a hurry. Find a back door or window that you can leave through.  Make a plan. Decide where to go in an emergency. Learn how to get there without a car. If you have children, make sure they know how to get there if you can t be with them.  Signal for help. If you trust a neighbor, set up an emergency signal, such as a crooked window blind. Ask the neighbor to call the police if they see this sign.  How to start  Leaving an abuser can be dangerous. Often the safest time to leave is soon after your abuser has made up with you. But you are the best  of when to leave. Trust your instincts and get ready. That way you can act quickly when the time is right. To get ready, do the following:  Pack an emergency bag. Include clothing, cash, car keys, any daily medicines, and important papers (such as birth certificates). Have a trusted friend keep these  items for you.  Find a safe place to live. A friend s house or a women s shelter may offer protection until you find a more permanent place.   Look into job training. Many women s shelters provide job referrals and child-care services.    Seek legal protection  Domestic abuse is against the law. Find out what your rights are. Women s shelters or hotlines can help you get started.  You re not alone  Remember that you are not alone. Look to friends, family, Alevism leaders, and counselors for support. Women s shelters and  can also help. Check online for resources in your area. Contact the National Domestic Violence Hotline at www.[employer].org. Or call them at 453-601-0326 or TTY 1?834?012?7122.

## 2021-06-23 NOTE — PROGRESS NOTES
See FR notes in chart.   Maude [facility] Financial Resource Guide  [phone]

## 2021-06-23 NOTE — PROGRESS NOTES
My Clinic Care Coordination Care Plan    [facility]  [address] [place]  [address]  [phone]    My Preferred Method of Contact:  Phone: [phone]    My Primary/Preferred Language:  Colleen    Preferred Learning Style:  Face to face discussion, Pictures/Diagrams and Hands on teaching    Emergency Contact: Extended Emergency Contact Information  Primary Emergency Contact: Tc Concepcion  Address: [address] [place] [address] [place]  Mobile Phone: [phone]  Relation: Spouse     PCP:  Jose L Flores MD  Specialists:    None    Hospitalizations and/or ED Visits  Most Recent: None     Previous:  None  Reason:  NA    Concerns regarding my health: [place], financial stress and culture barrier.     Advanced Directive/Living Will: The patient was given information regarding Adanced Directives/Living Will    [facility] elected to enroll in the [facility] Care Coordination.  [facility] was given a copy of the Clinic Care Coordination brochure and full description of how to access their care team both during clinic hours and after hours.   My Care Guide's Name and Phone Number: Andreas Win: [phone]   The Care Guide and myself agreed to be in contact every 2 weeks.      Medication Update  The patient's medication list is up to date per CCC RN    Health Maintenance and Immunization Update  The patient's preventive health screenings and immunizations are up to date per PCP.    My Emergency Plan:    All [facility] clinic patients have access to a Nurse 24 hours a day, 7 days a week.  If you have questions or want advice from a Nurse, please know [facility] is here for you.  You can call your clinic and they will connect you or you can call Care Connection at [phone].  [facility] also has Walk In Care clinics in multiple locations.  Call the number listed above for more information about our Walk In Care clinics or visit the [facility] website at  [email or web address].    Patient Support  I will ask Tc Concepcion for help in supporting me in these goals  Relationship to patient: Spouse  Cell # : [phone] , best time to call anytime.

## 2021-06-27 NOTE — PROGRESS NOTES
Progress Notes by Rose Bucio RN at 7/25/2019  8:10 AM     Author: Rose Bucio RN Service: -- Author Type: Registered Nurse    Filed: 7/25/2019  8:12 AM Encounter Date: 7/25/2019 Status: Signed    : Rose Bucio RN (Registered Nurse)       Clinic Care Coordination Contact    Situation: Patient chart reviewed by care coordinator.    Background:   Leola Martinez SW  You; Andreas Card CHW 10 days ago      Thanks Andreas!     Just completed chart review and note. Pt can be moved to maintenance.     Leola    Routing comment       Andreas Card CHW  You; Leola Martinez SW 2 weeks ago      FYI: patient is ready to move to maintenance as patient has met all goals at this time. Patient has ARM worker from [facility] and [facility] worker name and contact have been added to patient's team.    Routing comment          Assessment:   See above note from [facility] DANIELE.    Plan/Recommendations:   Emergency Plan Recommendation:    When to Use the Emergency Department (ED)  An emergency means you could die if you dont get care quickly. Or you could be hurt permanently (disabled). Read below to know when to use -- and when not to use -- an emergency department (also called ED).    Dangers to your life  Here are examples of emergencies. These need immediate care:  A hard time breathing  Severe chest pain  Choking  Severe bleeding  Suddenly not able to move or speak  Blacking out (fainting)`  Poisoning    Dangers of permanent injuries  Here are other emergencies. These also need immediate care:  Deep cuts or severe burns  Broken bones, or sudden severe pain and swelling in a joint    When its an emergency  If you have an emergency, follow these steps:    1. Go to the nearest emergency department  If you can, go to the hospital ED closest to you right away.  If you cannot get there right away, or if it is not safe to take yourself, call 911 or your police emergency number.  2. Call your primary care doctor  Tell your  doctor about the emergency. Call within 24 hours of going to the ED.  If you cannot call, have someone call for you.  Go to your doctor (not the ED) for any follow-up care.    When its not an emergency  If a problem is not an emergency, follow these steps:    1. Call your primary care doctor  If you dont know the name of your doctor, call your health plan.  If you cannot call, have someone call for you.  2. Follow instructions  Your doctor will tell you what you should do.  You may be told to see your doctor right away. You may be told to go to the ED. Or you may be told to go to an urgent care center.  Follow your doctors advice.

## 2021-07-03 NOTE — ADDENDUM NOTE
Addendum Note by Lio Osborne CMA at 1/15/2020 10:20 AM     Author: Lio Osborne CMA Service: -- Author Type: Certified Medical Assistant    Filed: 1/23/2020  7:49 AM Encounter Date: 1/15/2020 Status: Signed    : Lio Osborne CMA (Certified Medical Assistant)    Addended by: LIO OSBORNE on: 1/23/2020 07:49 AM        Modules accepted: Orders

## 2021-07-28 ENCOUNTER — OFFICE VISIT (OUTPATIENT)
Dept: FAMILY MEDICINE | Facility: CLINIC | Age: 41
End: 2021-07-28
Payer: COMMERCIAL

## 2021-07-28 VITALS
HEART RATE: 87 BPM | SYSTOLIC BLOOD PRESSURE: 102 MMHG | DIASTOLIC BLOOD PRESSURE: 78 MMHG | HEIGHT: 58 IN | TEMPERATURE: 98.4 F | RESPIRATION RATE: 20 BRPM | WEIGHT: 125 LBS | OXYGEN SATURATION: 98 % | BODY MASS INDEX: 26.24 KG/M2

## 2021-07-28 DIAGNOSIS — S80.11XD CONTUSION OF RIGHT LOWER EXTREMITY, SUBSEQUENT ENCOUNTER: ICD-10-CM

## 2021-07-28 DIAGNOSIS — D64.9 ANEMIA, UNSPECIFIED TYPE: ICD-10-CM

## 2021-07-28 DIAGNOSIS — S40.022D CONTUSION OF LEFT UPPER ARM, SUBSEQUENT ENCOUNTER: ICD-10-CM

## 2021-07-28 DIAGNOSIS — S20.212D CONTUSION OF LEFT FRONT WALL OF THORAX, SUBSEQUENT ENCOUNTER: Primary | ICD-10-CM

## 2021-07-28 DIAGNOSIS — R52 PAIN: ICD-10-CM

## 2021-07-28 DIAGNOSIS — D50.9 IRON DEFICIENCY ANEMIA, UNSPECIFIED IRON DEFICIENCY ANEMIA TYPE: ICD-10-CM

## 2021-07-28 LAB
BASOPHILS # BLD AUTO: 0 10E3/UL (ref 0–0.2)
BASOPHILS NFR BLD AUTO: 1 %
EOSINOPHIL # BLD AUTO: 0.3 10E3/UL (ref 0–0.7)
EOSINOPHIL NFR BLD AUTO: 4 %
ERYTHROCYTE [DISTWIDTH] IN BLOOD BY AUTOMATED COUNT: 19.5 % (ref 10–15)
FERRITIN SERPL-MCNC: 29 NG/ML (ref 10–130)
HCT VFR BLD AUTO: 36.6 % (ref 35–47)
HGB BLD-MCNC: 11.8 G/DL (ref 11.7–15.7)
IMM GRANULOCYTES # BLD: 0 10E3/UL
IMM GRANULOCYTES NFR BLD: 0 %
IRON SATN MFR SERPL: 11 % (ref 20–50)
IRON SERPL-MCNC: 48 UG/DL (ref 42–175)
LYMPHOCYTES # BLD AUTO: 1.6 10E3/UL (ref 0.8–5.3)
LYMPHOCYTES NFR BLD AUTO: 22 %
MCH RBC QN AUTO: 26.4 PG (ref 26.5–33)
MCHC RBC AUTO-ENTMCNC: 32.2 G/DL (ref 31.5–36.5)
MCV RBC AUTO: 82 FL (ref 78–100)
MONOCYTES # BLD AUTO: 0.5 10E3/UL (ref 0–1.3)
MONOCYTES NFR BLD AUTO: 7 %
NEUTROPHILS # BLD AUTO: 4.9 10E3/UL (ref 1.6–8.3)
NEUTROPHILS NFR BLD AUTO: 66 %
NRBC # BLD AUTO: 0 10E3/UL
NRBC BLD AUTO-RTO: 0 /100
PLATELET # BLD AUTO: 295 10E3/UL (ref 150–450)
RBC # BLD AUTO: 4.47 10E6/UL (ref 3.8–5.2)
RETICS # AUTO: 0.03 10E6/UL (ref 0.01–0.11)
RETICS/RBC NFR AUTO: 0.7 % (ref 0.8–2.7)
TIBC SERPL-MCNC: 425 UG/DL (ref 313–563)
TRANSFERRIN SERPL-MCNC: 339 MG/DL (ref 212–360)
TRANSFERRIN SERPL-MCNC: 340 MG/DL (ref 212–360)
WBC # BLD AUTO: 7.4 10E3/UL (ref 4–11)

## 2021-07-28 PROCEDURE — 85045 AUTOMATED RETICULOCYTE COUNT: CPT | Performed by: FAMILY MEDICINE

## 2021-07-28 PROCEDURE — 83020 HEMOGLOBIN ELECTROPHORESIS: CPT | Performed by: FAMILY MEDICINE

## 2021-07-28 PROCEDURE — 83021 HEMOGLOBIN CHROMOTOGRAPHY: CPT | Performed by: FAMILY MEDICINE

## 2021-07-28 PROCEDURE — 83540 ASSAY OF IRON: CPT | Performed by: FAMILY MEDICINE

## 2021-07-28 PROCEDURE — 82728 ASSAY OF FERRITIN: CPT | Performed by: FAMILY MEDICINE

## 2021-07-28 PROCEDURE — 99214 OFFICE O/P EST MOD 30 MIN: CPT | Performed by: FAMILY MEDICINE

## 2021-07-28 PROCEDURE — 85025 COMPLETE CBC W/AUTO DIFF WBC: CPT | Performed by: FAMILY MEDICINE

## 2021-07-28 PROCEDURE — 85060 BLOOD SMEAR INTERPRETATION: CPT | Performed by: PATHOLOGY

## 2021-07-28 PROCEDURE — 84466 ASSAY OF TRANSFERRIN: CPT | Performed by: FAMILY MEDICINE

## 2021-07-28 PROCEDURE — 36415 COLL VENOUS BLD VENIPUNCTURE: CPT | Performed by: FAMILY MEDICINE

## 2021-07-28 RX ORDER — FERROUS SULFATE 325(65) MG
325 TABLET ORAL DAILY
Qty: 60 TABLET | Refills: 6 | Status: SHIPPED | OUTPATIENT
Start: 2021-07-28 | End: 2022-12-07

## 2021-07-28 RX ORDER — ACETAMINOPHEN 500 MG
500-1000 TABLET ORAL EVERY 6 HOURS PRN
Qty: 200 TABLET | Refills: 11 | Status: SHIPPED | OUTPATIENT
Start: 2021-07-28 | End: 2022-12-07

## 2021-07-28 ASSESSMENT — MIFFLIN-ST. JEOR: SCORE: 1122.94
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
## 2021-07-29 LAB
PATH REPORT.COMMENTS IMP SPEC: NORMAL
PATH REPORT.COMMENTS IMP SPEC: NORMAL
PATH REPORT.FINAL DX SPEC: NORMAL
PATH REPORT.MICROSCOPIC SPEC OTHER STN: NORMAL
PATH REPORT.MICROSCOPIC SPEC OTHER STN: NORMAL
PATH REPORT.RELEVANT HX SPEC: NORMAL

## 2021-08-02 LAB
HEMOGLOBIN A2 QUANTITATION: 3.1 % (ref 2.2–3.5)
HEMOGLOBIN ELECTROPHRESIS: NORMAL
HEMOGLOBIN F QUANTITATION: <0.8 % (ref 0–2)
PATH ICD:: NORMAL
REVIEWING PATHOLOGIST: NORMAL

## 2022-09-19 ENCOUNTER — IMMUNIZATION (OUTPATIENT)
Dept: FAMILY MEDICINE | Facility: CLINIC | Age: 42
End: 2022-09-19
Payer: COMMERCIAL

## 2022-09-19 PROCEDURE — 90471 IMMUNIZATION ADMIN: CPT

## 2022-09-19 PROCEDURE — 90686 IIV4 VACC NO PRSV 0.5 ML IM: CPT

## 2022-12-07 ENCOUNTER — OFFICE VISIT (OUTPATIENT)
Dept: FAMILY MEDICINE | Facility: CLINIC | Age: 42
End: 2022-12-07
Payer: COMMERCIAL

## 2022-12-07 VITALS
WEIGHT: 126.5 LBS | RESPIRATION RATE: 16 BRPM | TEMPERATURE: 98 F | HEART RATE: 74 BPM | BODY MASS INDEX: 26.55 KG/M2 | SYSTOLIC BLOOD PRESSURE: 116 MMHG | OXYGEN SATURATION: 98 % | HEIGHT: 58 IN | DIASTOLIC BLOOD PRESSURE: 74 MMHG

## 2022-12-07 DIAGNOSIS — S80.11XD CONTUSION OF RIGHT LOWER EXTREMITY, SUBSEQUENT ENCOUNTER: ICD-10-CM

## 2022-12-07 DIAGNOSIS — Z11.59 NEED FOR HEPATITIS C SCREENING TEST: ICD-10-CM

## 2022-12-07 DIAGNOSIS — S40.022D CONTUSION OF LEFT UPPER ARM, SUBSEQUENT ENCOUNTER: ICD-10-CM

## 2022-12-07 DIAGNOSIS — D50.9 IRON DEFICIENCY ANEMIA, UNSPECIFIED IRON DEFICIENCY ANEMIA TYPE: Primary | ICD-10-CM

## 2022-12-07 DIAGNOSIS — R12 HEARTBURN: ICD-10-CM

## 2022-12-07 DIAGNOSIS — S20.212D CONTUSION OF LEFT FRONT WALL OF THORAX, SUBSEQUENT ENCOUNTER: ICD-10-CM

## 2022-12-07 DIAGNOSIS — R52 PAIN: ICD-10-CM

## 2022-12-07 DIAGNOSIS — R53.83 FATIGUE, UNSPECIFIED TYPE: ICD-10-CM

## 2022-12-07 LAB
ANION GAP SERPL CALCULATED.3IONS-SCNC: 10 MMOL/L (ref 7–15)
BUN SERPL-MCNC: 10.5 MG/DL (ref 6–20)
CALCIUM SERPL-MCNC: 9.6 MG/DL (ref 8.6–10)
CHLORIDE SERPL-SCNC: 106 MMOL/L (ref 98–107)
CREAT SERPL-MCNC: 0.64 MG/DL (ref 0.51–0.95)
DEPRECATED HCO3 PLAS-SCNC: 25 MMOL/L (ref 22–29)
ERYTHROCYTE [DISTWIDTH] IN BLOOD BY AUTOMATED COUNT: 15.7 % (ref 10–15)
GFR SERPL CREATININE-BSD FRML MDRD: >90 ML/MIN/1.73M2
GLUCOSE SERPL-MCNC: 89 MG/DL (ref 70–99)
HCT VFR BLD AUTO: 33.8 % (ref 35–47)
HGB BLD-MCNC: 10.2 G/DL (ref 11.7–15.7)
MCH RBC QN AUTO: 23.6 PG (ref 26.5–33)
MCHC RBC AUTO-ENTMCNC: 30.2 G/DL (ref 31.5–36.5)
MCV RBC AUTO: 78 FL (ref 78–100)
PLATELET # BLD AUTO: 317 10E3/UL (ref 150–450)
POTASSIUM SERPL-SCNC: 3.8 MMOL/L (ref 3.4–5.3)
RBC # BLD AUTO: 4.33 10E6/UL (ref 3.8–5.2)
SODIUM SERPL-SCNC: 141 MMOL/L (ref 136–145)
WBC # BLD AUTO: 8.3 10E3/UL (ref 4–11)

## 2022-12-07 PROCEDURE — 99214 OFFICE O/P EST MOD 30 MIN: CPT | Performed by: FAMILY MEDICINE

## 2022-12-07 PROCEDURE — 85027 COMPLETE CBC AUTOMATED: CPT | Performed by: FAMILY MEDICINE

## 2022-12-07 PROCEDURE — 86803 HEPATITIS C AB TEST: CPT | Performed by: FAMILY MEDICINE

## 2022-12-07 PROCEDURE — 36415 COLL VENOUS BLD VENIPUNCTURE: CPT | Performed by: FAMILY MEDICINE

## 2022-12-07 PROCEDURE — 80048 BASIC METABOLIC PNL TOTAL CA: CPT | Performed by: FAMILY MEDICINE

## 2022-12-07 RX ORDER — FAMOTIDINE 20 MG/1
20 TABLET, FILM COATED ORAL 2 TIMES DAILY
Qty: 60 TABLET | Refills: 1 | Status: SHIPPED | OUTPATIENT
Start: 2022-12-07 | End: 2023-05-10

## 2022-12-07 RX ORDER — ACETAMINOPHEN 500 MG
500-1000 TABLET ORAL EVERY 6 HOURS PRN
Qty: 200 TABLET | Refills: 11 | Status: SHIPPED | OUTPATIENT
Start: 2022-12-07 | End: 2023-05-10

## 2022-12-07 RX ORDER — ACETAMINOPHEN 500 MG
500-1000 TABLET ORAL EVERY 6 HOURS PRN
Qty: 200 TABLET | Refills: 11 | Status: SHIPPED | OUTPATIENT
Start: 2022-12-07 | End: 2022-12-07

## 2022-12-07 RX ORDER — FAMOTIDINE 20 MG/1
20 TABLET, FILM COATED ORAL 2 TIMES DAILY
Qty: 60 TABLET | Refills: 1 | Status: SHIPPED | OUTPATIENT
Start: 2022-12-07 | End: 2022-12-07

## 2022-12-07 RX ORDER — FERROUS SULFATE 325(65) MG
325 TABLET ORAL DAILY
Qty: 60 TABLET | Refills: 6 | Status: SHIPPED | OUTPATIENT
Start: 2022-12-07 | End: 2023-05-10

## 2022-12-07 NOTE — PROGRESS NOTES
Assessment & Plan     Need for hepatitis C screening test    - Hepatitis C Screen Reflex to HCV RNA Quant and Genotype; Future  - Hepatitis C Screen Reflex to HCV RNA Quant and Genotype    Iron deficiency anemia, unspecified iron deficiency anemia type    Patient has had KRISTINA in the past she states her symptoms are similar with multi joint tenderness and fatigue she denies any dizziness.  I have represcribed her ferrous sulfate  - CBC with platelets; Future  - Basic metabolic panel  (Ca, Cl, CO2, Creat, Gluc, K, Na, BUN); Future  - ferrous sulfate (FEROSUL) 325 (65 Fe) MG tablet; Take 1 tablet (325 mg) by mouth daily  - CBC with platelets  - Basic metabolic panel  (Ca, Cl, CO2, Creat, Gluc, K, Na, BUN)    Fatigue, unspecified type    No associated chest pain dyspnea GI symptoms.  Patient simply has fatigue but she comes home from work  - CBC with platelets; Future  - Basic metabolic panel  (Ca, Cl, CO2, Creat, Gluc, K, Na, BUN); Future  - CBC with platelets  - Basic metabolic panel  (Ca, Cl, CO2, Creat, Gluc, K, Na, BUN)    Contusion of left upper arm, subsequent encounter    Tenderness over the left arm no evidence of trauma.    Contusion of left front wall of thorax, subsequent encounter    Chest wall tenderness noted reproducible symptoms with inspiration.  Suggested using ice pack    Contusion of right lower extremity, subsequent encounter    Right lower extremity is tender to the touch no evidence of a hematoma    Heartburn    Refilled famotidine for GI appreciates this as she likes to eat spicy food  - famotidine (PEPCID) 20 MG tablet; Take 1 tablet (20 mg) by mouth 2 times daily    Pain      - Basic metabolic panel  (Ca, Cl, CO2, Creat, Gluc, K, Na, BUN); Future  - acetaminophen (TYLENOL) 500 MG tablet; Take 1-2 tablets (500-1,000 mg) by mouth every 6 hours as needed for mild pain  - Basic metabolic panel  (Ca, Cl, CO2, Creat, Gluc, K, Na, BUN)             BMI:   Estimated body mass index is 26.66 kg/m  as  "calculated from the following:    Height as of this encounter: 1.467 m (4' 9.76\").    Weight as of this encounter: 57.4 kg (126 lb 8 oz).           Return in about 3 months (around 3/7/2023).    MD EMORY Bullock [facility] LORI Ang is a 42 year old, presenting for the following health issues:  Chest Pain (X 1 month has gotten a bit better but still feeling the pain mostly when doing heavy duty work )      HPI   42-year-old female here with complaints of pain over the chest wall both shoulders her right lower leg and her left upper arm.  She also feels fatigued she says the symptoms have been present for more than 4 months.  She states she saw another doctor previously and had similar symptoms she denies any recent trauma denies any shortness of breath chest pain.  She has not had a fever or chills.  She states her appetite is diminished because she can eat spicy food.  She also complains of heartburn no dizziness no headaches no loss of consciousness noted        Review of Systems   Constitutional, HEENT, cardiovascular, pulmonary, gi and gu systems are negative, except as otherwise noted.      Objective    /74   Pulse 74   Temp 98  F (36.7  C) (Oral)   Resp 16   Ht 1.467 m (4' 9.76\")   Wt 57.4 kg (126 lb 8 oz)   LMP 11/30/2022 (Exact Date)   SpO2 98%   BMI 26.66 kg/m    Body mass index is 26.66 kg/m .  Physical Exam   GENERAL: healthy, alert and no distress  EYES: Eyes grossly normal to inspection, PERRL and conjunctivae and sclerae normal  HENT: ear canals and TM's normal, nose and mouth without ulcers or lesions  NECK: no adenopathy, no asymmetry, masses, or scars and thyroid normal to palpation  RESP: lungs clear to auscultation - no rales, rhonchi or wheezes  CV: regular rate and rhythm, normal S1 S2, no S3 or S4, no murmur, click or rub, no peripheral edema and peripheral pulses strong  ABDOMEN: soft, nontender, no hepatosplenomegaly, no masses and bowel sounds " normal  MS: no gross musculoskeletal defects noted, no edema  SKIN: no suspicious lesions or rashes  NEURO: Normal strength and tone, mentation intact and speech normal  PSYCH: mentation appears normal, affect normal/bright

## 2022-12-08 LAB — HCV AB SERPL QL IA: NONREACTIVE

## 2022-12-08 NOTE — RESULT ENCOUNTER NOTE
Please let inform patient that yesterday's lab test showed slightly lowered hemoglobin or iron in the blood however its not as low as before, her other lab tests including her kidney function tests are normal is she getting enough sleep?

## 2023-04-27 ENCOUNTER — TELEPHONE (OUTPATIENT)
Dept: FAMILY MEDICINE | Facility: CLINIC | Age: 43
End: 2023-04-27
Payer: COMMERCIAL

## 2023-04-27 NOTE — TELEPHONE ENCOUNTER
Patient Quality Outreach    Patient is due for the following:   Cervical Cancer Screening - PAP Needed    Next Steps:   Schedule a office visit for Fatigue and weakness.    Type of outreach:    Phone, spoke to patient/parent. Pt decline F/U. But asked to schedule an office visit for fatigue and weakness.       Questions for provider review:    None     Tenisha Benson MA

## 2023-05-10 ENCOUNTER — OFFICE VISIT (OUTPATIENT)
Dept: FAMILY MEDICINE | Facility: CLINIC | Age: 43
End: 2023-05-10
Payer: COMMERCIAL

## 2023-05-10 VITALS
WEIGHT: 131 LBS | OXYGEN SATURATION: 98 % | DIASTOLIC BLOOD PRESSURE: 64 MMHG | BODY MASS INDEX: 27.5 KG/M2 | HEIGHT: 58 IN | HEART RATE: 75 BPM | RESPIRATION RATE: 20 BRPM | TEMPERATURE: 98 F | SYSTOLIC BLOOD PRESSURE: 100 MMHG

## 2023-05-10 DIAGNOSIS — R07.89 CHEST WALL PAIN: ICD-10-CM

## 2023-05-10 DIAGNOSIS — R52 PAIN: ICD-10-CM

## 2023-05-10 DIAGNOSIS — D50.9 IRON DEFICIENCY ANEMIA, UNSPECIFIED IRON DEFICIENCY ANEMIA TYPE: ICD-10-CM

## 2023-05-10 DIAGNOSIS — K21.9 GASTROESOPHAGEAL REFLUX DISEASE WITHOUT ESOPHAGITIS: Primary | ICD-10-CM

## 2023-05-10 PROCEDURE — 99214 OFFICE O/P EST MOD 30 MIN: CPT | Performed by: FAMILY MEDICINE

## 2023-05-10 RX ORDER — FAMOTIDINE 20 MG/1
20 TABLET, FILM COATED ORAL 2 TIMES DAILY
Qty: 60 TABLET | Refills: 11 | Status: SHIPPED | OUTPATIENT
Start: 2023-05-10 | End: 2024-05-29

## 2023-05-10 RX ORDER — FERROUS SULFATE 325(65) MG
325 TABLET ORAL DAILY
Qty: 30 TABLET | Refills: 11 | Status: SHIPPED | OUTPATIENT
Start: 2023-05-10 | End: 2024-05-29

## 2023-05-10 RX ORDER — ACETAMINOPHEN 500 MG
500-1000 TABLET ORAL EVERY 6 HOURS PRN
Qty: 100 TABLET | Refills: 11 | Status: SHIPPED | OUTPATIENT
Start: 2023-05-10 | End: 2024-05-29

## 2023-05-10 NOTE — PROGRESS NOTES
"  Assessment & Plan     Gastroesophageal reflux disease without esophagitis    Not controlled.  Begin famotidine.  Recheck if not improving.    - famotidine (PEPCID) 20 MG tablet  Dispense: 60 tablet; Refill: 11    Iron deficiency anemia, unspecified iron deficiency anemia type    Stable.    - ferrous sulfate (FEROSUL) 325 (65 Fe) MG tablet  Dispense: 30 tablet; Refill: 11    Chest wall pain    Associated with lifting.  Expect this to resolve on its own.    Pain    - acetaminophen (TYLENOL) 500 MG tablet  Dispense: 100 tablet; Refill: 11        30 minutes spent by me on the date of the encounter doing chart review, review of test results and patient visit regarding reflux, anemia, chest wall pain.         BMI:   Estimated body mass index is 27.62 kg/m  as calculated from the following:    Height as of this encounter: 1.467 m (4' 9.75\").    Weight as of this encounter: 59.4 kg (131 lb).           Jose L Flores MD  [facility]    [facility] is a 43 year old, presenting for the following health issues:  Chest Pain (C/o chest pain, Fatigue, Poor appetite)        12/7/2022     2:08 PM   Additional Questions   Roomed by Cherry     Chest Pain     History of Present Illness       Reason for visit:  C/o Fatigue, chest pain    She eats 2-3 servings of fruits and vegetables daily.She consumes 0 sweetened beverage(s) daily.She exercises with enough effort to increase her heart rate 9 or less minutes per day.  She exercises with enough effort to increase her heart rate 3 or less days per week.   She is taking medications regularly.       Sore left pectoralis muscle area when lifting, for 2-3 days.    Epigastric burning, less appetite for 1 month.    Current Outpatient Medications   Medication Sig Dispense Refill     acetaminophen (TYLENOL) 500 MG tablet Take 1-2 tablets (500-1,000 mg) by mouth every 6 hours as needed for mild pain 100 tablet 11     famotidine (PEPCID) 20 MG tablet Take 1 tablet (20 " "mg) by mouth 2 times daily 60 tablet 11     ferrous sulfate (FEROSUL) 325 (65 Fe) MG tablet Take 1 tablet (325 mg) by mouth daily 30 tablet 11           Review of Systems   Cardiovascular: Positive for chest pain.      No melena.  No fever or cough.      Objective    /64 (BP Location: Left arm)   Pulse 75   Temp 98  F (36.7  C) (Oral)   Resp 20   Ht 1.467 m (4' 9.75\")   Wt 59.4 kg (131 lb)   LMP 04/16/2023   SpO2 98%   BMI 27.62 kg/m    Body mass index is 27.62 kg/m .  Physical Exam   Heart normal  Lungs normal  Abdomen normal  Eyes: no conjunctival pallor    Weight increased 5 # since December.                    "

## 2023-09-08 ENCOUNTER — TELEPHONE (OUTPATIENT)
Dept: FAMILY MEDICINE | Facility: CLINIC | Age: 43
End: 2023-09-08
Payer: COMMERCIAL

## 2023-09-08 NOTE — TELEPHONE ENCOUNTER
Reason for Call:  Appointment Request    Patient requesting this type of appt:  rash on both legs    Requested provider:  any provider - Maria De Jesus    Reason patient unable to be scheduled: Not within requested timeframe    When does patient want to be seen/preferred time:  Monday 9/11/23    Comments: Patient requesting work in for rash, declined virtual.    Okay to leave a detailed message?: Yes at Cell number on file:    Telephone Information:   Mobile [phone]       Call taken on 9/8/2023 at 1:37 PM by Pranav Caraballo

## 2023-09-08 NOTE — TELEPHONE ENCOUNTER
Called pt in an attempt to triage her symptoms. Left message to call clinic back.    If pt calls back, please have her be triaged by a nurse. Thanks        Joseph Phipps Cem Say, BSN RN  [facility]

## 2023-09-13 ENCOUNTER — OFFICE VISIT (OUTPATIENT)
Dept: FAMILY MEDICINE | Facility: CLINIC | Age: 43
End: 2023-09-13
Payer: COMMERCIAL

## 2023-09-13 VITALS
HEART RATE: 76 BPM | SYSTOLIC BLOOD PRESSURE: 111 MMHG | RESPIRATION RATE: 16 BRPM | TEMPERATURE: 98.1 F | WEIGHT: 132.8 LBS | HEIGHT: 57 IN | DIASTOLIC BLOOD PRESSURE: 73 MMHG | OXYGEN SATURATION: 100 % | BODY MASS INDEX: 28.65 KG/M2

## 2023-09-13 DIAGNOSIS — L30.9 DERMATITIS: Primary | ICD-10-CM

## 2023-09-13 DIAGNOSIS — L29.9 ITCHING: ICD-10-CM

## 2023-09-13 PROCEDURE — 99213 OFFICE O/P EST LOW 20 MIN: CPT | Performed by: FAMILY MEDICINE

## 2023-09-13 RX ORDER — HYDROXYZINE PAMOATE 25 MG/1
25 CAPSULE ORAL AT BEDTIME
Qty: 20 CAPSULE | Refills: 0 | Status: SHIPPED | OUTPATIENT
Start: 2023-09-13

## 2023-09-13 RX ORDER — TRIAMCINOLONE ACETONIDE 1 MG/G
CREAM TOPICAL 2 TIMES DAILY
Qty: 80 G | Refills: 1 | Status: SHIPPED | OUTPATIENT
Start: 2023-09-13

## 2023-09-13 NOTE — PROGRESS NOTES
"  Assessment & Plan     Dermatitis  Focal erythematous rash present on her legs only.  Present for about a month and a half Kenalog prescribed have asked her to stop using antifungal medications and medications obtained  - triamcinolone (KENALOG) 0.1 % external cream; Apply topically 2 times daily    Itching    She has been itching the mainly at her bedtime given her prescription of hydroxyzine side effects of the medication discussed in detail with the patient today  - hydrOXYzine (VISTARIL) 25 MG capsule; Take 1 capsule (25 mg) by mouth At Bedtime             BMI:   Estimated body mass index is 28.26 kg/m  as calculated from the following:    Height as of this encounter: 1.46 m (4' 9.48\").    Weight as of this encounter: 60.2 kg (132 lb 12.8 oz).         Sawyer Mcdonnell MD  [facility] LORI Ang is a 43 year old, presenting for the following health issues:  Derm Problem      9/13/2023     9:23 AM   Additional Questions   Roomed by jaelyn terrazas   Accompanied by Self       HPI       43 year-old female here for a skin rash has been present for about 6 weeks it started slowly and is only present on her legs and feet is very itchy and she says she has been scratching herself continuously mainly at night when she sleeps she reports that she tried some medication from the store but those did not help.  She has never had this rash before she was concerned because she thought she may have an infection.  She denies any fever chills or itchy skin on any other parts of her body she has any problems swallowing no wheezing or shortness of breath been noted    Review of Systems   Constitutional, HEENT, cardiovascular, pulmonary, gi and gu systems are negative, except as otherwise noted.      Objective    /73 (BP Location: Right arm, Patient Position: Sitting, Cuff Size: Adult Regular)   Pulse 76   Temp 98.1  F (36.7  C) (Oral)   Resp 16   Ht 1.46 m (4' 9.48\")   Wt 60.2 kg (132 lb 12.8 oz)   " LMP 08/02/2023 (Approximate)   SpO2 100%   BMI 28.26 kg/m    Body mass index is 28.26 kg/m .  Physical Exam   GENERAL: healthy, alert and no distress  EYES: Eyes grossly normal to inspection, PERRL and conjunctivae and sclerae normal  NECK: no adenopathy, no asymmetry, masses, or scars and thyroid normal to palpation  RESP: lungs clear to auscultation - no rales, rhonchi or wheezes  MS: no gross musculoskeletal defects noted, no edema  SKIN: no suspicious lesions or rashes, erythema - lower legs, lichenification - feet and lower legs, and excoriation - lower legs  NEURO: Normal strength and tone, mentation intact and speech normal  PSYCH: mentation appears normal, affect normal/bright

## 2023-09-18 ENCOUNTER — TELEPHONE (OUTPATIENT)
Dept: FAMILY MEDICINE | Facility: CLINIC | Age: 43
End: 2023-09-18

## 2023-09-18 NOTE — TELEPHONE ENCOUNTER
Patient Quality Outreach    Patient is due for the following:   Cervical Cancer Screening - PAP Needed  Physical Preventive Adult Physical      Topic Date Due    COVID-19 Vaccine (1) Never done    Flu Vaccine (1) 09/01/2023       Next Steps:   Schedule a Adult Preventative   Her last outreach was 2/27/23, which she declined pap smear. Will ask again to see what patient decides today.    Type of outreach:    Tried to call patient with abdoulaye  April ID # [id number], but unable to. No vm to leave a message either.       Questions for provider review:    None           Claudia Castro  Chart routed to Care Team.

## 2024-03-08 DIAGNOSIS — R05.1 ACUTE COUGH: Primary | ICD-10-CM

## 2024-03-08 RX ORDER — GUAIFENESIN/DEXTROMETHORPHAN 100-10MG/5
10 SYRUP ORAL 4 TIMES DAILY PRN
Qty: 240 ML | Refills: 1 | Status: SHIPPED | OUTPATIENT
Start: 2024-03-08 | End: 2024-05-29

## 2024-05-28 ENCOUNTER — TELEPHONE (OUTPATIENT)
Dept: FAMILY MEDICINE | Facility: CLINIC | Age: 44
End: 2024-05-28
Payer: COMMERCIAL

## 2024-05-28 DIAGNOSIS — Z00.00 PREVENTATIVE HEALTH CARE: ICD-10-CM

## 2024-05-28 DIAGNOSIS — R21 RASH AND NONSPECIFIC SKIN ERUPTION: Primary | ICD-10-CM

## 2024-05-28 NOTE — TELEPHONE ENCOUNTER
Reason for Call:  Appointment Request    Patient requesting this type of appt:  same day acute appt    Requested provider: Jose L Flores    Reason patient unable to be scheduled: Not within requested timeframe    When does patient want to be seen/preferred time: Same day    Comments: Itchy rash for 3 days. Gets this rash once a year on hip    Okay to leave a detailed message?: Yes at Home number on file [phone] (home)  Pt does not know how to retrieve VM    Call taken on 5/28/2024 at 7:35 AM by Coleen Pinzon

## 2024-05-29 ENCOUNTER — OFFICE VISIT (OUTPATIENT)
Dept: FAMILY MEDICINE | Facility: CLINIC | Age: 44
End: 2024-05-29
Payer: COMMERCIAL

## 2024-05-29 VITALS
OXYGEN SATURATION: 100 % | HEIGHT: 57 IN | SYSTOLIC BLOOD PRESSURE: 110 MMHG | WEIGHT: 132.8 LBS | DIASTOLIC BLOOD PRESSURE: 64 MMHG | TEMPERATURE: 98.3 F | RESPIRATION RATE: 14 BRPM | HEART RATE: 85 BPM | BODY MASS INDEX: 28.65 KG/M2

## 2024-05-29 DIAGNOSIS — Z00.00 PREVENTATIVE HEALTH CARE: ICD-10-CM

## 2024-05-29 DIAGNOSIS — R21 RASH AND NONSPECIFIC SKIN ERUPTION: Primary | ICD-10-CM

## 2024-05-29 DIAGNOSIS — D50.8 OTHER IRON DEFICIENCY ANEMIA: ICD-10-CM

## 2024-05-29 DIAGNOSIS — Z12.4 CERVICAL CANCER SCREENING: ICD-10-CM

## 2024-05-29 DIAGNOSIS — R73.03 PREDIABETES: ICD-10-CM

## 2024-05-29 LAB
BASOPHILS # BLD AUTO: 0.1 10E3/UL (ref 0–0.2)
BASOPHILS NFR BLD AUTO: 1 %
EOSINOPHIL # BLD AUTO: 0.3 10E3/UL (ref 0–0.7)
EOSINOPHIL NFR BLD AUTO: 5 %
ERYTHROCYTE [DISTWIDTH] IN BLOOD BY AUTOMATED COUNT: 16.8 % (ref 10–15)
HBA1C MFR BLD: 6 % (ref 0–5.6)
HCT VFR BLD AUTO: 28.6 % (ref 35–47)
HGB BLD-MCNC: 8.7 G/DL (ref 11.7–15.7)
IMM GRANULOCYTES # BLD: 0 10E3/UL
IMM GRANULOCYTES NFR BLD: 0 %
LYMPHOCYTES # BLD AUTO: 1.7 10E3/UL (ref 0.8–5.3)
LYMPHOCYTES NFR BLD AUTO: 27 %
MCH RBC QN AUTO: 21.4 PG (ref 26.5–33)
MCHC RBC AUTO-ENTMCNC: 30.4 G/DL (ref 31.5–36.5)
MCV RBC AUTO: 70 FL (ref 78–100)
MONOCYTES # BLD AUTO: 0.5 10E3/UL (ref 0–1.3)
MONOCYTES NFR BLD AUTO: 7 %
NEUTROPHILS # BLD AUTO: 3.9 10E3/UL (ref 1.6–8.3)
NEUTROPHILS NFR BLD AUTO: 60 %
PLATELET # BLD AUTO: 299 10E3/UL (ref 150–450)
RBC # BLD AUTO: 4.07 10E6/UL (ref 3.8–5.2)
WBC # BLD AUTO: 6.4 10E3/UL (ref 4–11)

## 2024-05-29 PROCEDURE — 36415 COLL VENOUS BLD VENIPUNCTURE: CPT | Performed by: FAMILY MEDICINE

## 2024-05-29 PROCEDURE — 83550 IRON BINDING TEST: CPT | Performed by: FAMILY MEDICINE

## 2024-05-29 PROCEDURE — 83540 ASSAY OF IRON: CPT | Performed by: FAMILY MEDICINE

## 2024-05-29 PROCEDURE — 80061 LIPID PANEL: CPT | Performed by: FAMILY MEDICINE

## 2024-05-29 PROCEDURE — 83036 HEMOGLOBIN GLYCOSYLATED A1C: CPT | Performed by: FAMILY MEDICINE

## 2024-05-29 PROCEDURE — 84295 ASSAY OF SERUM SODIUM: CPT | Mod: 59 | Performed by: FAMILY MEDICINE

## 2024-05-29 PROCEDURE — 99214 OFFICE O/P EST MOD 30 MIN: CPT | Performed by: FAMILY MEDICINE

## 2024-05-29 PROCEDURE — 80053 COMPREHEN METABOLIC PANEL: CPT | Performed by: FAMILY MEDICINE

## 2024-05-29 PROCEDURE — 85025 COMPLETE CBC W/AUTO DIFF WBC: CPT | Performed by: FAMILY MEDICINE

## 2024-05-29 PROCEDURE — 82728 ASSAY OF FERRITIN: CPT | Performed by: FAMILY MEDICINE

## 2024-05-29 RX ORDER — CETIRIZINE HYDROCHLORIDE 10 MG/1
10 TABLET ORAL DAILY
Qty: 30 TABLET | Refills: 4 | Status: SHIPPED | OUTPATIENT
Start: 2024-05-29

## 2024-05-29 RX ORDER — CLOBETASOL PROPIONATE 0.5 MG/G
OINTMENT TOPICAL 2 TIMES DAILY
Qty: 60 G | Refills: 1 | Status: SHIPPED | OUTPATIENT
Start: 2024-05-29

## 2024-05-29 NOTE — PROGRESS NOTES
"  Assessment & Plan     Rash and nonspecific skin eruption  Her rash is very itchy at times, and less itchy at times. This is a recurrence. She had it previously and triamcinolone 0.1% cream helped. This time, that same cream did not do much, she reports. She agrees to try cetirizine and clobetasol at this time. We'll see if it resolves.   - CBC with platelets and differential  - Comprehensive metabolic panel (BMP + Alb, Alk Phos, ALT, AST, Total. Bili, TP)  - Lipid panel reflex to direct LDL Fasting  - clobetasol (TEMOVATE) 0.05 % external ointment  Dispense: 60 g; Refill: 1  - cetirizine (ZYRTEC) 10 MG tablet  Dispense: 30 tablet; Refill: 4    Other iron deficiency anemia  Found on labs for rash. Hgb 8.7; last time checked it was 10.2. she agrees to eat meat daily (she has not been) and continue with lots of vegetables. Will check iron studies.   - Ferritin  - Iron and iron binding capacity  - Iron and iron binding capacity  - Ferritin  - CBC with platelets and differential  - Basic metabolic panel  (Ca, Cl, CO2, Creat, Gluc, K, Na, BUN)  - Ferritin    Prediabetes  Found previously - rechecked and confirmed prediabetes range. Did not address this more today, as the focus was on the rash and her anemia. It requires follow up.  - Hemoglobin A1c  - Hemoglobin A1c    Cervical cancer screening  Declined today - other issues took precedence    Preventative health care  reviewed  - REVIEW OF HEALTH MAINTENANCE PROTOCOL ORDERS  - CBC with platelets and differential    Recheck with PCP in a couple months to recheck rash, prediabetes, etc.    BMI  Estimated body mass index is 28.26 kg/m  as calculated from the following:    Height as of this encounter: 1.46 m (4' 9.48\").    Weight as of this encounter: 60.2 kg (132 lb 12.8 oz).                   Kayla Ang is a 44 year old, presenting for the following health issues:  itchy skin      5/29/2024     1:31 PM   Additional Questions   Roomed by aleks terrazas   Accompanied by son "     History of Present Illness       Reason for visit:  Itchy skin      Itching in a few places - behind the knees,     Used triamcinolone on it - last year it helped, this year it seems to do nothing.    Do you see worms in your poop? No    No fevers, no sickness, no  headache problems.        Objective    LMP 05/20/2024   There is no height or weight on file to calculate BMI.  Physical Exam   GENERAL: alert and no distress  RESP: lungs clear to auscultation - no rales, rhonchi or wheezes  CV: regular rate and rhythm, normal S1 S2, no S3 or S4, no murmur, click or rub, no peripheral edema  MS: no gross musculoskeletal defects noted, no edema  SKIN: on left hip/buttocks, there are a few erythematous spots, about 1cm x 1cm with some light eccymoses and possible petechiae in an area about [id number] (irregular); on the back of each knee are a couple additional erythematous spots, one of which has a scab. Some scratch marks seen. No edema around the rash.  PSYCH: mentation appears normal, affect normal/bright    Results for orders placed or performed in visit on 05/29/24 (from the past 24 hour(s))   CBC with platelets and differential    Narrative    The following orders were created for panel order CBC with platelets and differential.  Procedure                               Abnormality         Status                     ---------                               -----------         ------                     CBC with platelets and d...[[id number]]  Abnormal            Final result                 Please view results for these tests on the individual orders.   CBC with platelets and differential   Result Value Ref Range    WBC Count 6.4 4.0 - 11.0 10e3/uL    RBC Count 4.07 3.80 - 5.20 10e6/uL    Hemoglobin 8.7 (L) 11.7 - 15.7 g/dL    Hematocrit 28.6 (L) 35.0 - 47.0 %    MCV 70 (L) 78 - 100 fL    MCH 21.4 (L) 26.5 - 33.0 pg    MCHC 30.4 (L) 31.5 - 36.5 g/dL    RDW 16.8 (H) 10.0 - 15.0 %    Platelet Count 299 150 - 450 10e3/uL     % Neutrophils 60 %    % Lymphocytes 27 %    % Monocytes 7 %    % Eosinophils 5 %    % Basophils 1 %    % Immature Granulocytes 0 %    Absolute Neutrophils 3.9 1.6 - 8.3 10e3/uL    Absolute Lymphocytes 1.7 0.8 - 5.3 10e3/uL    Absolute Monocytes 0.5 0.0 - 1.3 10e3/uL    Absolute Eosinophils 0.3 0.0 - 0.7 10e3/uL    Absolute Basophils 0.1 0.0 - 0.2 10e3/uL    Absolute Immature Granulocytes 0.0 <=0.4 10e3/uL   Hemoglobin A1c   Result Value Ref Range    Hemoglobin A1C 6.0 (H) 0.0 - 5.6 %       I spent 30min on this patient/visit charting, doing the visit and exam, etc.    Signed Electronically by: Kassie Crawford MD

## 2024-05-30 LAB
ALBUMIN SERPL BCG-MCNC: 4.4 G/DL (ref 3.5–5.2)
ALP SERPL-CCNC: 95 U/L (ref 40–150)
ALT SERPL W P-5'-P-CCNC: 15 U/L (ref 0–50)
ANION GAP SERPL CALCULATED.3IONS-SCNC: 10 MMOL/L (ref 7–15)
ANION GAP SERPL CALCULATED.3IONS-SCNC: 10 MMOL/L (ref 7–15)
AST SERPL W P-5'-P-CCNC: 24 U/L (ref 0–45)
BILIRUB SERPL-MCNC: 0.3 MG/DL
BUN SERPL-MCNC: 7.6 MG/DL (ref 6–20)
BUN SERPL-MCNC: 7.6 MG/DL (ref 6–20)
CALCIUM SERPL-MCNC: 8.9 MG/DL (ref 8.6–10)
CALCIUM SERPL-MCNC: 8.9 MG/DL (ref 8.6–10)
CHLORIDE SERPL-SCNC: 107 MMOL/L (ref 98–107)
CHLORIDE SERPL-SCNC: 107 MMOL/L (ref 98–107)
CHOLEST SERPL-MCNC: 144 MG/DL
CREAT SERPL-MCNC: 0.66 MG/DL (ref 0.51–0.95)
CREAT SERPL-MCNC: 0.66 MG/DL (ref 0.51–0.95)
DEPRECATED HCO3 PLAS-SCNC: 23 MMOL/L (ref 22–29)
DEPRECATED HCO3 PLAS-SCNC: 23 MMOL/L (ref 22–29)
EGFRCR SERPLBLD CKD-EPI 2021: >90 ML/MIN/1.73M2
EGFRCR SERPLBLD CKD-EPI 2021: >90 ML/MIN/1.73M2
FASTING STATUS PATIENT QL REPORTED: NO
FERRITIN SERPL-MCNC: 15 NG/ML (ref 6–175)
GLUCOSE SERPL-MCNC: 146 MG/DL (ref 70–99)
GLUCOSE SERPL-MCNC: 146 MG/DL (ref 70–99)
HDLC SERPL-MCNC: 40 MG/DL
IRON BINDING CAPACITY (ROCHE): 446 UG/DL (ref 240–430)
IRON SATN MFR SERPL: 4 % (ref 15–46)
IRON SERPL-MCNC: 20 UG/DL (ref 37–145)
LDLC SERPL CALC-MCNC: 83 MG/DL
NONHDLC SERPL-MCNC: 104 MG/DL
POTASSIUM SERPL-SCNC: 4 MMOL/L (ref 3.4–5.3)
POTASSIUM SERPL-SCNC: 4 MMOL/L (ref 3.4–5.3)
PROT SERPL-MCNC: 7.1 G/DL (ref 6.4–8.3)
SODIUM SERPL-SCNC: 140 MMOL/L (ref 135–145)
SODIUM SERPL-SCNC: 140 MMOL/L (ref 135–145)
TRIGL SERPL-MCNC: 103 MG/DL

## 2024-06-20 ENCOUNTER — TELEPHONE (OUTPATIENT)
Dept: FAMILY MEDICINE | Facility: CLINIC | Age: 44
End: 2024-06-20
Payer: COMMERCIAL

## 2024-06-20 NOTE — TELEPHONE ENCOUNTER
Patient Quality Outreach    Patient is due for the following:   Breast Cancer Screening - Mammogram  Cervical Cancer Screening - PAP Needed  Physical Preventive Adult Physical    Next Steps:   Schedule a Adult Preventative    Type of outreach:    Phone, called but no answer. Will try again another time.       Questions for provider review:    None           Claudia Castro MA

## 2024-06-20 NOTE — TELEPHONE ENCOUNTER
Patient Quality Outreach    Patient is due for the following:   Breast Cancer Screening - Mammogram  Cervical Cancer Screening - PAP Needed  Physical Preventive Adult Physical      Topic Date Due    COVID-19 Vaccine (1 - 2023-24 season) Never done       Next Steps:   Patient has upcoming appointment, these items will be addressed at that time.    Type of outreach:    Chart review performed, no outreach needed.      Questions for provider review:    None           Claudia Castro MA

## 2025-04-15 ENCOUNTER — HOSPITAL ENCOUNTER (EMERGENCY)
Facility: HOSPITAL | Age: 45
Discharge: STILL ADMITTED OR ADMINISTRATIVE | End: 2025-04-16
Payer: COMMERCIAL

## 2025-04-15 ENCOUNTER — OFFICE VISIT (OUTPATIENT)
Dept: URGENT CARE | Facility: URGENT CARE | Age: 45
End: 2025-04-15
Payer: COMMERCIAL

## 2025-04-15 VITALS
RESPIRATION RATE: 16 BRPM | HEART RATE: 67 BPM | SYSTOLIC BLOOD PRESSURE: 127 MMHG | DIASTOLIC BLOOD PRESSURE: 82 MMHG | OXYGEN SATURATION: 100 % | HEIGHT: 58 IN | WEIGHT: 136 LBS | BODY MASS INDEX: 28.55 KG/M2 | TEMPERATURE: 97.8 F

## 2025-04-15 DIAGNOSIS — D64.9 ANEMIA, UNSPECIFIED TYPE: ICD-10-CM

## 2025-04-15 DIAGNOSIS — N92.1 MENORRHAGIA WITH IRREGULAR CYCLE: Primary | ICD-10-CM

## 2025-04-15 LAB
ERYTHROCYTE [DISTWIDTH] IN BLOOD BY AUTOMATED COUNT: 18.8 % (ref 10–15)
FERRITIN SERPL-MCNC: 13 NG/ML (ref 6–175)
HCG UR QL: NEGATIVE
HCT VFR BLD AUTO: 25.8 % (ref 35–47)
HGB BLD-MCNC: 7.3 G/DL (ref 11.7–15.7)
IRON BINDING CAPACITY (ROCHE): 421 UG/DL (ref 240–430)
IRON SATN MFR SERPL: 2 % (ref 15–46)
IRON SERPL-MCNC: 10 UG/DL (ref 37–145)
MCH RBC QN AUTO: 18.2 PG (ref 26.5–33)
MCHC RBC AUTO-ENTMCNC: 28.3 G/DL (ref 31.5–36.5)
MCV RBC AUTO: 64 FL (ref 78–100)
PLATELET # BLD AUTO: 358 10E3/UL (ref 150–450)
RBC # BLD AUTO: 4.01 10E6/UL (ref 3.8–5.2)
T4 FREE SERPL-MCNC: 1.01 NG/DL (ref 0.9–1.7)
TSH SERPL DL<=0.005 MIU/L-ACNC: 10.1 UIU/ML (ref 0.3–4.2)
WBC # BLD AUTO: 5.8 10E3/UL (ref 4–11)

## 2025-04-15 PROCEDURE — 36415 COLL VENOUS BLD VENIPUNCTURE: CPT | Performed by: PHYSICIAN ASSISTANT

## 2025-04-15 PROCEDURE — 83550 IRON BINDING TEST: CPT | Performed by: PHYSICIAN ASSISTANT

## 2025-04-15 PROCEDURE — 99214 OFFICE O/P EST MOD 30 MIN: CPT | Performed by: PHYSICIAN ASSISTANT

## 2025-04-15 PROCEDURE — 84439 ASSAY OF FREE THYROXINE: CPT | Performed by: PHYSICIAN ASSISTANT

## 2025-04-15 PROCEDURE — 82728 ASSAY OF FERRITIN: CPT | Performed by: PHYSICIAN ASSISTANT

## 2025-04-15 PROCEDURE — 85027 COMPLETE CBC AUTOMATED: CPT | Performed by: PHYSICIAN ASSISTANT

## 2025-04-15 PROCEDURE — 3074F SYST BP LT 130 MM HG: CPT | Performed by: PHYSICIAN ASSISTANT

## 2025-04-15 PROCEDURE — 84443 ASSAY THYROID STIM HORMONE: CPT | Performed by: PHYSICIAN ASSISTANT

## 2025-04-15 PROCEDURE — 83540 ASSAY OF IRON: CPT | Performed by: PHYSICIAN ASSISTANT

## 2025-04-15 PROCEDURE — 3079F DIAST BP 80-89 MM HG: CPT | Performed by: PHYSICIAN ASSISTANT

## 2025-04-15 PROCEDURE — 81025 URINE PREGNANCY TEST: CPT | Performed by: PHYSICIAN ASSISTANT

## 2025-04-15 RX ORDER — FERROUS SULFATE 325(65) MG
325 TABLET ORAL 2 TIMES DAILY
Qty: 180 TABLET | Refills: 0 | Status: SHIPPED | OUTPATIENT
Start: 2025-04-15 | End: 2025-07-14

## 2025-04-15 RX ORDER — MEDROXYPROGESTERONE ACETATE 10 MG
10 TABLET ORAL 3 TIMES DAILY
Qty: 30 TABLET | Refills: 0 | Status: SHIPPED | OUTPATIENT
Start: 2025-04-15 | End: 2025-04-25
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
## 2025-04-15 NOTE — ED NOTES
Expected Patient Referral to ED  12:20 PM    Referring Clinic/Provider:  [facility]    Reason for referral/Clinical facts:  44 y/o f prolonged menses 2-3wk  Hgb 7.3, chronic anemia (last 8.7)  Vss - no tachycardia    Recommendations provided:  Send to ED for further evaluation    Caller was informed that this institution does possess the capabilities and/or resources to provide for patient and should be transferred to our facility.    Discussed that if direct admit is sought and any hurdles are encountered, this ED would be happy to see the patient and evaluate.    Informed caller that recommendations provided are recommendations based only on the facts provided and that they responsible to accept or reject the advice, or to seek a formal in person consultation as needed and that this ED will see/treat patient should they arrive.      Evette Alvarenga MD  [facility] EMERGENCY DEPARTMENT  [address] [place] [id number]  [phone]       Evette Alvarenga MD  04/15/25 1224

## 2025-04-15 NOTE — PATIENT INSTRUCTIONS
Follow up with your primary care provider this week to recheck anemia     Start Provera and iron supplement.      See obgyn in the next 1-2 week

## 2025-04-17 ENCOUNTER — OFFICE VISIT (OUTPATIENT)
Dept: FAMILY MEDICINE | Facility: CLINIC | Age: 45
End: 2025-04-17
Payer: COMMERCIAL

## 2025-04-17 VITALS
TEMPERATURE: 97.9 F | DIASTOLIC BLOOD PRESSURE: 74 MMHG | OXYGEN SATURATION: 100 % | HEIGHT: 58 IN | BODY MASS INDEX: 28.66 KG/M2 | RESPIRATION RATE: 16 BRPM | WEIGHT: 136.56 LBS | HEART RATE: 70 BPM | SYSTOLIC BLOOD PRESSURE: 108 MMHG

## 2025-04-17 DIAGNOSIS — N92.0 MENORRHAGIA WITH REGULAR CYCLE: Primary | ICD-10-CM

## 2025-04-17 DIAGNOSIS — D50.0 IRON DEFICIENCY ANEMIA DUE TO CHRONIC BLOOD LOSS: ICD-10-CM

## 2025-04-17 DIAGNOSIS — R79.89 ELEVATED TSH: ICD-10-CM

## 2025-04-17 LAB
ERYTHROCYTE [DISTWIDTH] IN BLOOD BY AUTOMATED COUNT: 18.9 % (ref 10–15)
HCT VFR BLD AUTO: 25.8 % (ref 35–47)
HGB BLD-MCNC: 7.6 G/DL (ref 11.7–15.7)
MCH RBC QN AUTO: 18.8 PG (ref 26.5–33)
MCHC RBC AUTO-ENTMCNC: 29.5 G/DL (ref 31.5–36.5)
MCV RBC AUTO: 64 FL (ref 78–100)
PLATELET # BLD AUTO: 383 10E3/UL (ref 150–450)
RBC # BLD AUTO: 4.04 10E6/UL (ref 3.8–5.2)
T4 FREE SERPL-MCNC: 1.08 NG/DL (ref 0.9–1.7)
TSH SERPL DL<=0.005 MIU/L-ACNC: 9.29 UIU/ML (ref 0.3–4.2)
WBC # BLD AUTO: 7.7 10E3/UL (ref 4–11)

## 2025-04-17 NOTE — PROGRESS NOTES
"  Assessment & Plan     Menorrhagia with regular cycle  Iron deficiency anemia  Repeat CBC today to make sure stable. Other than fatigue, no symptoms of anemia, and not tachycardic, SOB, or hypotensive. Bleeding stopped yesterday after starting provera. Continue provera for full course. Continue iron, but since causing nausea, can decrease to once daily. Discussed OCPs, depo, and that there are other more permanent options for heavy periods. Declines today, but prefers to follow up if heavy bleeding happens again. Discussed that I suspect it will, since she has had it recurrently, and informed her that the provera she is taking right now will not fix the problem for future periods. She continues to decline treatment, and states she will follow up if next period is heavy, and will start depo. Discussed the need for pelvic ultrasound to assess health of uterus, she is only willing to do if NO transvaginal exam is done. Order placed with notation to only do transabdominal images.   - CBC with platelets  - US Pelvic Complete with Transvaginal    Elevated TSH  Recheck and check TPO abs. Consider treating if confirmed high TSH on recheck (especially if pos TPO abs) due to heavy periods (although not having other symptoms such as constipation).   - TSH with free T4 reflex  - Thyroid peroxidase antibody        BMI  Estimated body mass index is 28.17 kg/m  as calculated from the following:    Height as of this encounter: 1.483 m (4' 10.39\").    Weight as of this encounter: 61.9 kg (136 lb 9 oz).             Subjective   Sav is a 45 year old, presenting for the following health issues:  Follow Up (Feels like vomiting after taking the iron tablets)        4/17/2025     9:00 AM   Additional Questions   Roomed by Erendira CAT   Accompanied by self     [facility] 4/15 for heavy prolonged period - Bleeding 3/28-4/15    Anemia acute on chronic - hgb at [facility] down from 8.7 (10 mos ago) to 7.3. Microcytic. Low iron and ferritin. Neg UPT.    Iron " "tablets are making her nauseated - but stilll taking tid, sometimes has thrown up.    Vaginal bleeding has stopped since going to ER - stopped yesterday (after starting medroxyprogesterone). Referred to Gyn but does not want to go.    Has had heavy periods before, would last about 10 days, never for 3 weeks like this    Elevated TSH, normal free T4  Fatigued but likely due to anemia  Denies constipation  No abdominal fullness, pain or bloating                        Objective    /74   Pulse 70   Temp 97.9  F (36.6  C) (Oral)   Resp 16   Ht 1.483 m (4' 10.39\")   Wt 61.9 kg (136 lb 9 oz)   LMP  (LMP Unknown)   SpO2 100%   BMI 28.17 kg/m    Body mass index is 28.17 kg/m .  Physical Exam   GENERAL: alert and no distress  HENT: mouth without ulcers or lesions (betel nut staining)  NECK: no adenopathy, no thyromegaly or nodules appreciated  RESP: lungs clear to auscultation - no rales, rhonchi or wheezes  CV: regular rate and rhythm, normal S1 S2, no S3 or S4, no murmur, click or rub, no peripheral edema  MS: no gross musculoskeletal defects noted, no edema            Signed Electronically by: Abby Little MD    "

## 2025-04-21 ENCOUNTER — TELEPHONE (OUTPATIENT)
Dept: FAMILY MEDICINE | Facility: CLINIC | Age: 45
End: 2025-04-21
Payer: COMMERCIAL

## 2025-04-21 NOTE — TELEPHONE ENCOUNTER
Called patient and relayed message she verbalized understanding. Lab Appt scheduled 4/25 at Call and follow-up scheduled 6/2.      Joseph Castelan, BSN RN  [facility]

## 2025-04-21 NOTE — TELEPHONE ENCOUNTER
----- Message from Abbypratibha Little sent at 4/21/2025  9:36 AM CDT -----  Please call patient with results-  Blood tests shows her anemia is stable compared to when she was in urgent care. Blood tests also show her thyroid gland is not working normally, and she should start a daily medication for this. The thyroid problem could be contributing to her heavy periods. Start levothyroxine 25mcg daily (take first thing in the morning every morning) and recheck thyroid labs in 6 weeks - please schedule OV for thyroid med check/labs.     I would like her anemia lab rechecked later this week (lab only is ok) to make sure it is improving - she could do this the day she has her ultrasound 4/25 - please help schedule CBC lab at [facility] clinic.

## 2025-08-31 ENCOUNTER — HEALTH MAINTENANCE LETTER (OUTPATIENT)
Age: 45
End: 2025-08-31